# Patient Record
Sex: FEMALE | Race: WHITE | NOT HISPANIC OR LATINO | Employment: FULL TIME | URBAN - METROPOLITAN AREA
[De-identification: names, ages, dates, MRNs, and addresses within clinical notes are randomized per-mention and may not be internally consistent; named-entity substitution may affect disease eponyms.]

---

## 2017-09-12 DIAGNOSIS — Z00.00 ROUTINE GENERAL MEDICAL EXAMINATION AT A HEALTH CARE FACILITY: Primary | ICD-10-CM

## 2017-09-25 ENCOUNTER — CLINICAL SUPPORT (OUTPATIENT)
Dept: CARDIOLOGY | Facility: CLINIC | Age: 37
End: 2017-09-25
Payer: COMMERCIAL

## 2017-09-25 ENCOUNTER — OFFICE VISIT (OUTPATIENT)
Dept: INTERNAL MEDICINE | Facility: CLINIC | Age: 37
End: 2017-09-25
Payer: COMMERCIAL

## 2017-09-25 ENCOUNTER — CLINICAL SUPPORT (OUTPATIENT)
Dept: INTERNAL MEDICINE | Facility: CLINIC | Age: 37
End: 2017-09-25
Payer: COMMERCIAL

## 2017-09-25 VITALS
TEMPERATURE: 98 F | BODY MASS INDEX: 25.43 KG/M2 | HEIGHT: 63 IN | WEIGHT: 143.5 LBS | SYSTOLIC BLOOD PRESSURE: 108 MMHG | HEART RATE: 68 BPM | DIASTOLIC BLOOD PRESSURE: 68 MMHG

## 2017-09-25 VITALS
SYSTOLIC BLOOD PRESSURE: 108 MMHG | DIASTOLIC BLOOD PRESSURE: 68 MMHG | WEIGHT: 143.5 LBS | BODY MASS INDEX: 25.42 KG/M2 | HEART RATE: 68 BPM

## 2017-09-25 DIAGNOSIS — Z00.00 ROUTINE GENERAL MEDICAL EXAMINATION AT A HEALTH CARE FACILITY: ICD-10-CM

## 2017-09-25 DIAGNOSIS — Z00.00 ROUTINE GENERAL MEDICAL EXAMINATION AT A HEALTH CARE FACILITY: Primary | ICD-10-CM

## 2017-09-25 DIAGNOSIS — Z00.00 ANNUAL PHYSICAL EXAM: Primary | ICD-10-CM

## 2017-09-25 LAB
ALBUMIN SERPL BCP-MCNC: 4.3 G/DL
ALP SERPL-CCNC: 37 U/L
ALT SERPL W/O P-5'-P-CCNC: 8 U/L
ANION GAP SERPL CALC-SCNC: 9 MMOL/L
AST SERPL-CCNC: 14 U/L
BILIRUB SERPL-MCNC: 0.7 MG/DL
BUN SERPL-MCNC: 8 MG/DL
CALCIUM SERPL-MCNC: 9.6 MG/DL
CHLORIDE SERPL-SCNC: 108 MMOL/L
CHOLEST SERPL-MCNC: 184 MG/DL
CHOLEST/HDLC SERPL: 3.4 {RATIO}
CO2 SERPL-SCNC: 25 MMOL/L
CREAT SERPL-MCNC: 0.7 MG/DL
ERYTHROCYTE [DISTWIDTH] IN BLOOD BY AUTOMATED COUNT: 13 %
EST. GFR  (AFRICAN AMERICAN): >60 ML/MIN/1.73 M^2
EST. GFR  (NON AFRICAN AMERICAN): >60 ML/MIN/1.73 M^2
ESTIMATED AVG GLUCOSE: 94 MG/DL
GLUCOSE SERPL-MCNC: 93 MG/DL
HBA1C MFR BLD HPLC: 4.9 %
HCT VFR BLD AUTO: 39.3 %
HDLC SERPL-MCNC: 54 MG/DL
HDLC SERPL: 29.3 %
HGB BLD-MCNC: 13.6 G/DL
LDLC SERPL CALC-MCNC: 114.8 MG/DL
MCH RBC QN AUTO: 29.8 PG
MCHC RBC AUTO-ENTMCNC: 34.6 G/DL
MCV RBC AUTO: 86 FL
NONHDLC SERPL-MCNC: 130 MG/DL
PLATELET # BLD AUTO: 214 K/UL
PMV BLD AUTO: 10.2 FL
POTASSIUM SERPL-SCNC: 4.4 MMOL/L
PROT SERPL-MCNC: 7.8 G/DL
RBC # BLD AUTO: 4.57 M/UL
SODIUM SERPL-SCNC: 142 MMOL/L
TRIGL SERPL-MCNC: 76 MG/DL
WBC # BLD AUTO: 6.96 K/UL

## 2017-09-25 PROCEDURE — 93000 ELECTROCARDIOGRAM COMPLETE: CPT | Mod: S$GLB,,, | Performed by: NUCLEAR MEDICINE

## 2017-09-25 PROCEDURE — 83036 HEMOGLOBIN GLYCOSYLATED A1C: CPT

## 2017-09-25 PROCEDURE — 80053 COMPREHEN METABOLIC PANEL: CPT | Mod: PO

## 2017-09-25 PROCEDURE — 99999 PR PBB SHADOW E&M-EST. PATIENT-LVL III: CPT | Mod: PBBFAC,,, | Performed by: FAMILY MEDICINE

## 2017-09-25 PROCEDURE — 85027 COMPLETE CBC AUTOMATED: CPT | Mod: PO

## 2017-09-25 PROCEDURE — 99395 PREV VISIT EST AGE 18-39: CPT | Mod: S$GLB,,, | Performed by: FAMILY MEDICINE

## 2017-09-25 PROCEDURE — 80061 LIPID PANEL: CPT | Mod: PO

## 2017-09-25 NOTE — PROGRESS NOTES
Subjective:       Patient ID: Dominga Contreras is a 36 y.o. female.    Chief Complaint: Annual Exam    Annual Exam:      Pt is a healthy 36 year old who is in generally good health. Pt is up-to-date on her women wellness.       Review of Systems   Constitutional: Negative.  Negative for activity change, chills, fatigue and fever.   HENT: Negative.  Negative for congestion, ear pain, postnasal drip, rhinorrhea, sinus pressure, sore throat and trouble swallowing.    Eyes: Negative for visual disturbance.   Respiratory: Negative.  Negative for cough, chest tightness, shortness of breath and wheezing.    Gastrointestinal: Negative.    Endocrine: Negative.  Negative for cold intolerance and heat intolerance.   Genitourinary: Negative.  Negative for dysuria, hematuria, urgency, vaginal bleeding and vaginal discharge.   Musculoskeletal: Negative for arthralgias, back pain, joint swelling and neck stiffness.   Skin: Negative for color change and rash.   Neurological: Negative.  Negative for dizziness, tremors, seizures, syncope, weakness, light-headedness and headaches.   Hematological: Negative.    Psychiatric/Behavioral: Negative.  Negative for agitation, confusion, sleep disturbance and suicidal ideas. The patient is not nervous/anxious.        Objective:      Physical Exam   Constitutional: She is oriented to person, place, and time. She appears well-developed and well-nourished.   HENT:   Head: Normocephalic.   Eyes: EOM are normal. Pupils are equal, round, and reactive to light.   Neck: Normal range of motion. No thyromegaly present.   Cardiovascular: Normal rate and regular rhythm.    No murmur heard.  Pulmonary/Chest: Effort normal and breath sounds normal.   Abdominal: Soft. Bowel sounds are normal.   Musculoskeletal: Normal range of motion.   Neurological: She is alert and oriented to person, place, and time. She has normal reflexes.   Skin: Skin is warm.   Psychiatric: She has a normal mood and affect. Her behavior  is normal.   Vitals reviewed.      Assessment:       No diagnosis found.    Plan:       There are no diagnoses linked to this encounter.

## 2018-08-23 ENCOUNTER — PATIENT OUTREACH (OUTPATIENT)
Dept: ADMINISTRATIVE | Facility: HOSPITAL | Age: 38
End: 2018-08-23

## 2018-09-05 ENCOUNTER — OFFICE VISIT (OUTPATIENT)
Dept: INTERNAL MEDICINE | Facility: CLINIC | Age: 38
End: 2018-09-05
Payer: COMMERCIAL

## 2018-09-05 ENCOUNTER — CLINICAL SUPPORT (OUTPATIENT)
Dept: INTERNAL MEDICINE | Facility: CLINIC | Age: 38
End: 2018-09-05

## 2018-09-05 VITALS
DIASTOLIC BLOOD PRESSURE: 78 MMHG | BODY MASS INDEX: 25.01 KG/M2 | BODY MASS INDEX: 24.99 KG/M2 | SYSTOLIC BLOOD PRESSURE: 110 MMHG | SYSTOLIC BLOOD PRESSURE: 110 MMHG | HEART RATE: 62 BPM | HEIGHT: 63 IN | HEART RATE: 62 BPM | WEIGHT: 141.13 LBS | DIASTOLIC BLOOD PRESSURE: 78 MMHG | TEMPERATURE: 98 F | RESPIRATION RATE: 14 BRPM | WEIGHT: 141.13 LBS

## 2018-09-05 DIAGNOSIS — Z00.00 ANNUAL PHYSICAL EXAM: Primary | ICD-10-CM

## 2018-09-05 DIAGNOSIS — Z00.00 ROUTINE GENERAL MEDICAL EXAMINATION AT A HEALTH CARE FACILITY: Primary | ICD-10-CM

## 2018-09-05 LAB
ALBUMIN SERPL BCP-MCNC: 4.4 G/DL
ALP SERPL-CCNC: 32 U/L
ALT SERPL W/O P-5'-P-CCNC: 13 U/L
ANION GAP SERPL CALC-SCNC: 8 MMOL/L
AST SERPL-CCNC: 16 U/L
BILIRUB SERPL-MCNC: 0.5 MG/DL
BUN SERPL-MCNC: 11 MG/DL
CALCIUM SERPL-MCNC: 9.4 MG/DL
CHLORIDE SERPL-SCNC: 105 MMOL/L
CHOLEST SERPL-MCNC: 193 MG/DL
CHOLEST/HDLC SERPL: 3.1 {RATIO}
CO2 SERPL-SCNC: 27 MMOL/L
CREAT SERPL-MCNC: 0.8 MG/DL
ERYTHROCYTE [DISTWIDTH] IN BLOOD BY AUTOMATED COUNT: 13.1 %
EST. GFR  (AFRICAN AMERICAN): >60 ML/MIN/1.73 M^2
EST. GFR  (NON AFRICAN AMERICAN): >60 ML/MIN/1.73 M^2
ESTIMATED AVG GLUCOSE: 94 MG/DL
GLUCOSE SERPL-MCNC: 91 MG/DL
HBA1C MFR BLD HPLC: 4.9 %
HCT VFR BLD AUTO: 39.9 %
HDLC SERPL-MCNC: 62 MG/DL
HDLC SERPL: 32.1 %
HGB BLD-MCNC: 13.1 G/DL
LDLC SERPL CALC-MCNC: 108.6 MG/DL
MCH RBC QN AUTO: 28.4 PG
MCHC RBC AUTO-ENTMCNC: 32.8 G/DL
MCV RBC AUTO: 87 FL
NONHDLC SERPL-MCNC: 131 MG/DL
PLATELET # BLD AUTO: 243 K/UL
PMV BLD AUTO: 9.9 FL
POTASSIUM SERPL-SCNC: 4.3 MMOL/L
PROT SERPL-MCNC: 7.7 G/DL
RBC # BLD AUTO: 4.61 M/UL
SODIUM SERPL-SCNC: 140 MMOL/L
TRIGL SERPL-MCNC: 112 MG/DL
WBC # BLD AUTO: 4.82 K/UL

## 2018-09-05 PROCEDURE — 80061 LIPID PANEL: CPT | Mod: PO

## 2018-09-05 PROCEDURE — 99385 PREV VISIT NEW AGE 18-39: CPT | Mod: S$GLB,,, | Performed by: FAMILY MEDICINE

## 2018-09-05 PROCEDURE — 80053 COMPREHEN METABOLIC PANEL: CPT | Mod: PO

## 2018-09-05 PROCEDURE — 83036 HEMOGLOBIN GLYCOSYLATED A1C: CPT

## 2018-09-05 PROCEDURE — 99999 PR PBB SHADOW E&M-EST. PATIENT-LVL III: CPT | Mod: PBBFAC,,, | Performed by: FAMILY MEDICINE

## 2018-09-05 PROCEDURE — 85027 COMPLETE CBC AUTOMATED: CPT | Mod: PO

## 2018-09-05 NOTE — PROGRESS NOTES
Subjective:       Patient ID: Dominga Contreras is a 37 y.o. female.    Chief Complaint: Executive Health    Annual exam:      Pt is a 37 year old healthy female. No acute or chronic medical issues. She is up-to-date on her wellness.       Review of Systems   Constitutional: Negative.  Negative for activity change, chills, fatigue and fever.   HENT: Negative.  Negative for congestion, ear pain, postnasal drip, rhinorrhea, sinus pressure, sore throat and trouble swallowing.    Eyes: Negative for visual disturbance.   Respiratory: Negative.  Negative for cough, chest tightness, shortness of breath and wheezing.    Gastrointestinal: Negative.    Endocrine: Negative.  Negative for cold intolerance and heat intolerance.   Genitourinary: Negative.  Negative for dysuria, hematuria, urgency, vaginal bleeding and vaginal discharge.   Musculoskeletal: Negative for arthralgias, back pain, joint swelling and neck stiffness.   Skin: Negative for color change and rash.   Neurological: Negative.  Negative for dizziness, tremors, seizures, syncope, weakness, light-headedness and headaches.   Hematological: Negative.    Psychiatric/Behavioral: Negative.  Negative for agitation, confusion, sleep disturbance and suicidal ideas. The patient is not nervous/anxious.        Objective:      Physical Exam   Constitutional: She is oriented to person, place, and time. She appears well-developed and well-nourished.   HENT:   Head: Normocephalic.   Eyes: EOM are normal. Pupils are equal, round, and reactive to light.   Neck: Normal range of motion. No thyromegaly present.   Cardiovascular: Normal rate and regular rhythm.   No murmur heard.  Pulmonary/Chest: Effort normal and breath sounds normal.   Abdominal: Soft. Bowel sounds are normal.   Musculoskeletal: Normal range of motion.   Neurological: She is alert and oriented to person, place, and time. She has normal reflexes.   Skin: Skin is warm.   Psychiatric: She has a normal mood and affect. Her  behavior is normal.   Vitals reviewed.      Assessment:       1. Annual physical exam        Plan:       Annual physical exam  Comments:  Pt is over all healthy without any chronic issues

## 2018-12-10 PROBLEM — Z00.00 ANNUAL PHYSICAL EXAM: Status: RESOLVED | Noted: 2018-09-05 | Resolved: 2018-12-10

## 2019-06-20 DIAGNOSIS — Z00.00 ROUTINE GENERAL MEDICAL EXAMINATION AT A HEALTH CARE FACILITY: Primary | ICD-10-CM

## 2019-06-24 DIAGNOSIS — Z00.00 ROUTINE GENERAL MEDICAL EXAMINATION AT A HEALTH CARE FACILITY: Primary | ICD-10-CM

## 2019-08-09 ENCOUNTER — CLINICAL SUPPORT (OUTPATIENT)
Dept: INTERNAL MEDICINE | Facility: CLINIC | Age: 39
End: 2019-08-09
Payer: COMMERCIAL

## 2019-08-09 ENCOUNTER — CLINICAL SUPPORT (OUTPATIENT)
Dept: CARDIOLOGY | Facility: CLINIC | Age: 39
End: 2019-08-09
Payer: COMMERCIAL

## 2019-08-09 ENCOUNTER — OFFICE VISIT (OUTPATIENT)
Dept: INTERNAL MEDICINE | Facility: CLINIC | Age: 39
End: 2019-08-09
Payer: COMMERCIAL

## 2019-08-09 ENCOUNTER — CLINICAL SUPPORT (OUTPATIENT)
Dept: REHABILITATION | Facility: HOSPITAL | Age: 39
End: 2019-08-09
Payer: COMMERCIAL

## 2019-08-09 VITALS
DIASTOLIC BLOOD PRESSURE: 70 MMHG | BODY MASS INDEX: 25.38 KG/M2 | RESPIRATION RATE: 16 BRPM | TEMPERATURE: 98 F | WEIGHT: 143.31 LBS | HEART RATE: 72 BPM | SYSTOLIC BLOOD PRESSURE: 114 MMHG

## 2019-08-09 VITALS
WEIGHT: 143.31 LBS | DIASTOLIC BLOOD PRESSURE: 70 MMHG | RESPIRATION RATE: 16 BRPM | HEART RATE: 72 BPM | SYSTOLIC BLOOD PRESSURE: 114 MMHG | HEIGHT: 63 IN | BODY MASS INDEX: 25.39 KG/M2

## 2019-08-09 DIAGNOSIS — Z00.00 ROUTINE GENERAL MEDICAL EXAMINATION AT A HEALTH CARE FACILITY: Primary | ICD-10-CM

## 2019-08-09 DIAGNOSIS — Z00.00 ANNUAL PHYSICAL EXAM: Primary | ICD-10-CM

## 2019-08-09 DIAGNOSIS — Z00.00 ROUTINE GENERAL MEDICAL EXAMINATION AT A HEALTH CARE FACILITY: ICD-10-CM

## 2019-08-09 LAB
ALBUMIN SERPL BCP-MCNC: 4.4 G/DL (ref 3.5–5.2)
ALP SERPL-CCNC: 36 U/L (ref 55–135)
ALT SERPL W/O P-5'-P-CCNC: 14 U/L (ref 10–44)
ANION GAP SERPL CALC-SCNC: 10 MMOL/L (ref 8–16)
AST SERPL-CCNC: 15 U/L (ref 10–40)
BILIRUB SERPL-MCNC: 0.6 MG/DL (ref 0.1–1)
BUN SERPL-MCNC: 9 MG/DL (ref 6–20)
CALCIUM SERPL-MCNC: 10 MG/DL (ref 8.7–10.5)
CHLORIDE SERPL-SCNC: 104 MMOL/L (ref 95–110)
CHOLEST SERPL-MCNC: 207 MG/DL (ref 120–199)
CHOLEST/HDLC SERPL: 2.8 {RATIO} (ref 2–5)
CO2 SERPL-SCNC: 26 MMOL/L (ref 23–29)
CREAT SERPL-MCNC: 0.8 MG/DL (ref 0.5–1.4)
ERYTHROCYTE [DISTWIDTH] IN BLOOD BY AUTOMATED COUNT: 12.6 % (ref 11.5–14.5)
EST. GFR  (AFRICAN AMERICAN): >60 ML/MIN/1.73 M^2
EST. GFR  (NON AFRICAN AMERICAN): >60 ML/MIN/1.73 M^2
ESTIMATED AVG GLUCOSE: 97 MG/DL (ref 68–131)
GLUCOSE SERPL-MCNC: 90 MG/DL (ref 70–110)
HBA1C MFR BLD HPLC: 5 % (ref 4–5.6)
HCT VFR BLD AUTO: 39.9 % (ref 37–48.5)
HDLC SERPL-MCNC: 75 MG/DL (ref 40–75)
HDLC SERPL: 36.2 % (ref 20–50)
HGB BLD-MCNC: 13.2 G/DL (ref 12–16)
LDLC SERPL CALC-MCNC: 116.6 MG/DL (ref 63–159)
MCH RBC QN AUTO: 28.7 PG (ref 27–31)
MCHC RBC AUTO-ENTMCNC: 33.1 G/DL (ref 32–36)
MCV RBC AUTO: 87 FL (ref 82–98)
NONHDLC SERPL-MCNC: 132 MG/DL
PLATELET # BLD AUTO: 249 K/UL (ref 150–350)
PMV BLD AUTO: 9.8 FL (ref 9.2–12.9)
POTASSIUM SERPL-SCNC: 4.5 MMOL/L (ref 3.5–5.1)
PROT SERPL-MCNC: 7.6 G/DL (ref 6–8.4)
RBC # BLD AUTO: 4.6 M/UL (ref 4–5.4)
SODIUM SERPL-SCNC: 140 MMOL/L (ref 136–145)
TRIGL SERPL-MCNC: 77 MG/DL (ref 30–150)
TSH SERPL DL<=0.005 MIU/L-ACNC: 1.11 UIU/ML (ref 0.4–4)
WBC # BLD AUTO: 6.05 K/UL (ref 3.9–12.7)

## 2019-08-09 PROCEDURE — 83036 HEMOGLOBIN GLYCOSYLATED A1C: CPT

## 2019-08-09 PROCEDURE — 93015 CARDIAC TREADMILL STRESS TEST: ICD-10-PCS | Mod: S$GLB,,, | Performed by: INTERNAL MEDICINE

## 2019-08-09 PROCEDURE — 93010 ELECTROCARDIOGRAM REPORT: CPT | Mod: S$PBB,,, | Performed by: INTERNAL MEDICINE

## 2019-08-09 PROCEDURE — 93015 CV STRESS TEST SUPVJ I&R: CPT | Mod: S$GLB,,, | Performed by: INTERNAL MEDICINE

## 2019-08-09 PROCEDURE — 99999 PR PBB SHADOW E&M-EST. PATIENT-LVL III: CPT | Mod: PBBFAC,,, | Performed by: FAMILY MEDICINE

## 2019-08-09 PROCEDURE — 99386 PREV VISIT NEW AGE 40-64: CPT | Mod: S$GLB,,, | Performed by: FAMILY MEDICINE

## 2019-08-09 PROCEDURE — 84443 ASSAY THYROID STIM HORMONE: CPT

## 2019-08-09 PROCEDURE — 97750 PHYSICAL PERFORMANCE TEST: CPT | Performed by: PHYSICAL THERAPIST

## 2019-08-09 PROCEDURE — 93005 ELECTROCARDIOGRAM TRACING: CPT | Mod: PBBFAC | Performed by: INTERNAL MEDICINE

## 2019-08-09 PROCEDURE — 99386 PR PREVENTIVE VISIT,NEW,40-64: ICD-10-PCS | Mod: S$GLB,,, | Performed by: FAMILY MEDICINE

## 2019-08-09 PROCEDURE — 80053 COMPREHEN METABOLIC PANEL: CPT

## 2019-08-09 PROCEDURE — 99999 PR PBB SHADOW E&M-EST. PATIENT-LVL III: ICD-10-PCS | Mod: PBBFAC,,, | Performed by: FAMILY MEDICINE

## 2019-08-09 PROCEDURE — 93010 EKG 12-LEAD: ICD-10-PCS | Mod: S$PBB,,, | Performed by: INTERNAL MEDICINE

## 2019-08-09 PROCEDURE — 85027 COMPLETE CBC AUTOMATED: CPT

## 2019-08-09 PROCEDURE — 80061 LIPID PANEL: CPT

## 2019-08-09 NOTE — PROGRESS NOTES
Subjective:       Patient ID: Dominga Contreras is a 38 y.o. female.    Chief Complaint: Executive Health    Annual Exam:      Pt is a 38 year old here for executive physical. Pt is overall healthy with no major medical. She does see GYN with [facility].    Review of Systems   Constitutional: Negative.    Respiratory: Negative.    Cardiovascular: Negative.    Genitourinary: Negative.    Musculoskeletal: Negative.    Neurological: Negative.    Hematological: Negative.    Psychiatric/Behavioral: Negative.        Objective:      Physical Exam   Constitutional: She is oriented to person, place, and time. She appears well-developed and well-nourished.   Cardiovascular: Normal rate and regular rhythm. Exam reveals no friction rub.   No murmur heard.  Pulmonary/Chest: Effort normal and breath sounds normal. No stridor.   Abdominal: Soft. Bowel sounds are normal. She exhibits no distension.   Neurological: She is alert and oriented to person, place, and time.   Skin: Skin is warm and dry.   Psychiatric: She has a normal mood and affect. Her behavior is normal.       Assessment:       1. Annual physical exam        Plan:       Annual physical exam  Comments:  Will do CBC, CMP, Lipid

## 2019-08-09 NOTE — PROGRESS NOTES
:  76    DX: Z00.0  Orders:  Fitness Evaluation    Patient's 2nd [facility] Fitness Component evaluation was completed.  Results are as follows:    Height (in):    63                            Weight (lbs):    143                                    BMI:   25.4    Resting Energy Expenditure:         1290       kcal/24 hrs.              Estimated:    1292     REE measured post treadmill:     Yes     ~ 45 minutes   REE measured fasting:       Yes           Body Composition:          27.76  % body fat             Rating: Good    Waist to Hip Ratio:     0.72                    Risk:  low   Hip taken over clothing:      Yes          Muscular Strength and Endurance Assessment:               Strength (lbs):     Right: 57.7             Rating:  Below average      Left:  54.7           Rating: average   Push-ups:   28                 Rating:  excellent   Curl-ups :   50                Rating:  Well above average    Flexibility Testing:   Sit and Reach (cm):    46                       Rating:  excellent    The patient completed the testing procedures without complications.

## 2019-08-21 LAB — DIASTOLIC DYSFUNCTION: NO

## 2019-11-11 PROBLEM — Z00.00 ANNUAL PHYSICAL EXAM: Status: RESOLVED | Noted: 2018-09-05 | Resolved: 2019-11-11

## 2020-08-11 DIAGNOSIS — Z91.89 AT RISK FOR CORONARY ARTERY DISEASE: Primary | ICD-10-CM

## 2020-08-12 ENCOUNTER — CLINICAL SUPPORT (OUTPATIENT)
Dept: INTERNAL MEDICINE | Facility: CLINIC | Age: 40
End: 2020-08-12
Payer: COMMERCIAL

## 2020-08-12 ENCOUNTER — HOSPITAL ENCOUNTER (OUTPATIENT)
Dept: RADIOLOGY | Facility: HOSPITAL | Age: 40
Discharge: HOME | End: 2020-08-12
Attending: FAMILY MEDICINE
Payer: COMMERCIAL

## 2020-08-12 ENCOUNTER — OFFICE VISIT (OUTPATIENT)
Dept: INTERNAL MEDICINE | Facility: CLINIC | Age: 40
End: 2020-08-12
Payer: COMMERCIAL

## 2020-08-12 VITALS
HEART RATE: 82 BPM | BODY MASS INDEX: 27.34 KG/M2 | TEMPERATURE: 100 F | WEIGHT: 154.31 LBS | SYSTOLIC BLOOD PRESSURE: 124 MMHG | OXYGEN SATURATION: 98 % | HEIGHT: 63 IN | DIASTOLIC BLOOD PRESSURE: 77 MMHG

## 2020-08-12 DIAGNOSIS — Z00.00 ROUTINE GENERAL MEDICAL EXAMINATION AT A HEALTH CARE FACILITY: Primary | ICD-10-CM

## 2020-08-12 DIAGNOSIS — Z91.89 AT RISK FOR CORONARY ARTERY DISEASE: ICD-10-CM

## 2020-08-12 DIAGNOSIS — R91.1 SOLITARY PULMONARY NODULE: ICD-10-CM

## 2020-08-12 DIAGNOSIS — Z00.00 ANNUAL PHYSICAL EXAM: Primary | ICD-10-CM

## 2020-08-12 LAB
ALBUMIN SERPL BCP-MCNC: 4.4 G/DL (ref 3.5–5.2)
ALP SERPL-CCNC: 30 U/L (ref 55–135)
ALT SERPL W/O P-5'-P-CCNC: 14 U/L (ref 10–44)
ANION GAP SERPL CALC-SCNC: 10 MMOL/L (ref 8–16)
AST SERPL-CCNC: 15 U/L (ref 10–40)
BILIRUB SERPL-MCNC: 0.5 MG/DL (ref 0.1–1)
BUN SERPL-MCNC: 9 MG/DL (ref 6–20)
CALCIUM SERPL-MCNC: 9.2 MG/DL (ref 8.7–10.5)
CHLORIDE SERPL-SCNC: 104 MMOL/L (ref 95–110)
CHOLEST SERPL-MCNC: 209 MG/DL (ref 120–199)
CHOLEST/HDLC SERPL: 3.3 {RATIO} (ref 2–5)
CO2 SERPL-SCNC: 24 MMOL/L (ref 23–29)
CREAT SERPL-MCNC: 0.8 MG/DL (ref 0.5–1.4)
ERYTHROCYTE [DISTWIDTH] IN BLOOD BY AUTOMATED COUNT: 12.7 % (ref 11.5–14.5)
EST. GFR  (AFRICAN AMERICAN): >60 ML/MIN/1.73 M^2
EST. GFR  (NON AFRICAN AMERICAN): >60 ML/MIN/1.73 M^2
ESTIMATED AVG GLUCOSE: 100 MG/DL (ref 68–131)
GLUCOSE SERPL-MCNC: 92 MG/DL (ref 70–110)
HBA1C MFR BLD HPLC: 5.1 % (ref 4–5.6)
HCT VFR BLD AUTO: 39.4 % (ref 37–48.5)
HDLC SERPL-MCNC: 63 MG/DL (ref 40–75)
HDLC SERPL: 30.1 % (ref 20–50)
HGB BLD-MCNC: 13 G/DL (ref 12–16)
LDLC SERPL CALC-MCNC: 126.4 MG/DL (ref 63–159)
MCH RBC QN AUTO: 29 PG (ref 27–31)
MCHC RBC AUTO-ENTMCNC: 33 G/DL (ref 32–36)
MCV RBC AUTO: 88 FL (ref 82–98)
NONHDLC SERPL-MCNC: 146 MG/DL
PLATELET # BLD AUTO: 230 K/UL (ref 150–350)
PMV BLD AUTO: 9.7 FL (ref 9.2–12.9)
POTASSIUM SERPL-SCNC: 4.3 MMOL/L (ref 3.5–5.1)
PROT SERPL-MCNC: 7.7 G/DL (ref 6–8.4)
RBC # BLD AUTO: 4.49 M/UL (ref 4–5.4)
SODIUM SERPL-SCNC: 138 MMOL/L (ref 136–145)
TRIGL SERPL-MCNC: 98 MG/DL (ref 30–150)
TSH SERPL DL<=0.005 MIU/L-ACNC: 1.23 UIU/ML (ref 0.4–4)
WBC # BLD AUTO: 4.82 K/UL (ref 3.9–12.7)

## 2020-08-12 PROCEDURE — 3008F BODY MASS INDEX DOCD: CPT | Mod: CPTII,S$GLB,, | Performed by: FAMILY MEDICINE

## 2020-08-12 PROCEDURE — 99999 PR PBB SHADOW E&M-EST. PATIENT-LVL III: CPT | Mod: PBBFAC,,, | Performed by: FAMILY MEDICINE

## 2020-08-12 PROCEDURE — 75571 CT CALCIUM SCORING CARDIAC: ICD-10-PCS | Mod: 26,,, | Performed by: RADIOLOGY

## 2020-08-12 PROCEDURE — 84443 ASSAY THYROID STIM HORMONE: CPT

## 2020-08-12 PROCEDURE — 80053 COMPREHEN METABOLIC PANEL: CPT

## 2020-08-12 PROCEDURE — 75571 CT HRT W/O DYE W/CA TEST: CPT | Mod: 26,,, | Performed by: RADIOLOGY

## 2020-08-12 PROCEDURE — 99386 PREV VISIT NEW AGE 40-64: CPT | Mod: S$GLB,,, | Performed by: FAMILY MEDICINE

## 2020-08-12 PROCEDURE — 99386 PR PREVENTIVE VISIT,NEW,40-64: ICD-10-PCS | Mod: S$GLB,,, | Performed by: FAMILY MEDICINE

## 2020-08-12 PROCEDURE — 3008F PR BODY MASS INDEX (BMI) DOCUMENTED: ICD-10-PCS | Mod: CPTII,S$GLB,, | Performed by: FAMILY MEDICINE

## 2020-08-12 PROCEDURE — 80061 LIPID PANEL: CPT

## 2020-08-12 PROCEDURE — 75571 CT HRT W/O DYE W/CA TEST: CPT | Mod: TC

## 2020-08-12 PROCEDURE — 99999 PR PBB SHADOW E&M-EST. PATIENT-LVL III: ICD-10-PCS | Mod: PBBFAC,,, | Performed by: FAMILY MEDICINE

## 2020-08-12 PROCEDURE — 83036 HEMOGLOBIN GLYCOSYLATED A1C: CPT

## 2020-08-12 PROCEDURE — 85027 COMPLETE CBC AUTOMATED: CPT

## 2020-08-12 NOTE — PROGRESS NOTES
Subjective:       Patient ID: Dominga Contreras is a 39 y.o. female.    Chief Complaint: Executive Health    Pt is a healthy 39 year old. Reviewed her labs and CT cardiac which was zero. However did  a 4 mm pulmonary nodule.    Review of Systems   Respiratory: Negative.    Cardiovascular: Negative.    Genitourinary: Negative.    Neurological: Negative.    Hematological: Negative.    Psychiatric/Behavioral: Negative.          Objective:      Physical Exam  Vitals signs reviewed.   Constitutional:       Appearance: She is well-developed.   HENT:      Head: Normocephalic.   Eyes:      Pupils: Pupils are equal, round, and reactive to light.   Neck:      Musculoskeletal: Normal range of motion.      Thyroid: No thyromegaly.   Cardiovascular:      Rate and Rhythm: Normal rate and regular rhythm.      Heart sounds: No murmur.   Pulmonary:      Effort: Pulmonary effort is normal.      Breath sounds: Normal breath sounds.   Abdominal:      General: Bowel sounds are normal.      Palpations: Abdomen is soft.   Musculoskeletal: Normal range of motion.   Skin:     General: Skin is warm.   Neurological:      Mental Status: She is alert and oriented to person, place, and time.      Deep Tendon Reflexes: Reflexes are normal and symmetric.   Psychiatric:         Behavior: Behavior normal.         Assessment:       1. Annual physical exam    2. Solitary pulmonary nodule        Plan:       Annual physical exam  Comments:  Pt is over all healthy    Solitary pulmonary nodule  Comments:  Will do CT scan without contrast  Orders:  -     CT Chest Without Contrast; Future; Expected date: 08/12/2020

## 2020-08-17 ENCOUNTER — HOSPITAL ENCOUNTER (OUTPATIENT)
Dept: RADIOLOGY | Facility: HOSPITAL | Age: 40
Discharge: HOME | End: 2020-08-17
Attending: FAMILY MEDICINE
Payer: COMMERCIAL

## 2020-08-17 DIAGNOSIS — R91.1 SOLITARY PULMONARY NODULE: ICD-10-CM

## 2020-08-17 PROCEDURE — 71250 CT CHEST WITHOUT CONTRAST: ICD-10-PCS | Mod: 26,,, | Performed by: RADIOLOGY

## 2020-08-17 PROCEDURE — 71250 CT THORAX DX C-: CPT | Mod: 26,,, | Performed by: RADIOLOGY

## 2020-08-17 PROCEDURE — 71250 CT THORAX DX C-: CPT | Mod: TC

## 2020-08-18 ENCOUNTER — TELEPHONE (OUTPATIENT)
Dept: INTERNAL MEDICINE | Facility: CLINIC | Age: 40
End: 2020-08-18

## 2020-08-18 NOTE — TELEPHONE ENCOUNTER
----- Message from Priscilla Romano sent at 8/18/2020  3:26 PM CDT -----  Type:  Test Results    Who Called: Dominga  Name of Test (Lab/Mammo/Etc): CT  Date of Test: 08/17/2020  Ordering Provider: Grabiel  Where the test was performed: [facility]  Would the patient rather a call back or a response via [facility]? call  Best Call Back Number: [phone]    Additional Information:

## 2020-08-18 NOTE — TELEPHONE ENCOUNTER
Informed pt that her message regarding results has been sent to Dr. [place] and that as soon as we hear back from him, we will contact her. She verbalized understanding.

## 2020-11-10 ENCOUNTER — OFFICE VISIT (OUTPATIENT)
Dept: URGENT CARE | Facility: CLINIC | Age: 40
End: 2020-11-10
Payer: COMMERCIAL

## 2020-11-10 VITALS
WEIGHT: 150 LBS | HEART RATE: 87 BPM | OXYGEN SATURATION: 100 % | DIASTOLIC BLOOD PRESSURE: 76 MMHG | RESPIRATION RATE: 18 BRPM | HEIGHT: 63 IN | SYSTOLIC BLOOD PRESSURE: 125 MMHG | TEMPERATURE: 99 F | BODY MASS INDEX: 26.58 KG/M2

## 2020-11-10 DIAGNOSIS — R50.9 FEVER, UNSPECIFIED FEVER CAUSE: ICD-10-CM

## 2020-11-10 DIAGNOSIS — J32.9 SINUSITIS, UNSPECIFIED CHRONICITY, UNSPECIFIED LOCATION: ICD-10-CM

## 2020-11-10 DIAGNOSIS — R53.83 FATIGUE, UNSPECIFIED TYPE: Primary | ICD-10-CM

## 2020-11-10 LAB
CTP QC/QA: YES
SARS-COV-2 RDRP RESP QL NAA+PROBE: NEGATIVE

## 2020-11-10 PROCEDURE — U0002 COVID-19 LAB TEST NON-CDC: HCPCS | Mod: QW,S$GLB,, | Performed by: PHYSICIAN ASSISTANT

## 2020-11-10 PROCEDURE — 99213 PR OFFICE/OUTPT VISIT, EST, LEVL III, 20-29 MIN: ICD-10-PCS | Mod: S$GLB,CS,, | Performed by: PHYSICIAN ASSISTANT

## 2020-11-10 PROCEDURE — U0002: ICD-10-PCS | Mod: QW,S$GLB,, | Performed by: PHYSICIAN ASSISTANT

## 2020-11-10 PROCEDURE — 99213 OFFICE O/P EST LOW 20 MIN: CPT | Mod: S$GLB,CS,, | Performed by: PHYSICIAN ASSISTANT

## 2020-11-10 RX ORDER — AMOXICILLIN AND CLAVULANATE POTASSIUM 875; 125 MG/1; MG/1
1 TABLET, FILM COATED ORAL 2 TIMES DAILY
Qty: 20 TABLET | Refills: 0 | Status: SHIPPED | OUTPATIENT
Start: 2020-11-10 | End: 2021-08-25

## 2020-11-10 NOTE — LETTER
[address] [facility] [address] ? [place], [id number] ? Phone [phone] ? Fax             Return to Work/School    Patient: Dominga Contreras  YOB: 1980   Date: 11/10/2020      To Whom It May Concern:     Dominga Contreras was in contact with/seen in my office on 11/10/2020. COVID-19 is present in our communities across the state. Not all patients are eligible or appropriate to be tested. In this situation, your employee meets the following criteria:     Dominga Contreras has met the criteria for COVID-19 testing and has a NEGATIVE result. The employee can return to work once they are asymptomatic for 24 hours without the use of fever reducing medications (Tylenol, Motrin, etc).     If you have any questions or concerns, or if I can be of further assistance, please do not hesitate to contact me.     Sincerely,    Sobia Conley PA-C

## 2020-11-10 NOTE — PROGRESS NOTES
"Subjective:       Patient ID: Dominga Contreras is a 39 y.o. female.    Vitals:  height is 5' 3" (1.6 m) and weight is 68 kg (150 lb). Her temporal temperature is 98.8 °F (37.1 °C). Her blood pressure is 125/76 and her pulse is 87. Her respiration is 18 and oxygen saturation is 100%.     Chief Complaint: No chief complaint on file.    Symptoms began yesterday. Headache began Sunday. Pt took motrin for symptoms. Pt states headache has her ears hurting and she is having a lot of pressure in her head. More pressure in ears due to headache. She also reports fever >100. Pt's daughter was recently exposed to covid and is required to quarantine from school.     Sinus Problem  This is a new problem. The current episode started yesterday. The problem has been gradually worsening since onset. The maximum temperature recorded prior to her arrival was 100.4 - 100.9 F. Her pain is at a severity of 6/10. The pain is mild. Associated symptoms include chills, diaphoresis, ear pain, headaches and sinus pressure. Pertinent negatives include no congestion, coughing, shortness of breath or sore throat. Treatments tried: motrin. The treatment provided mild relief.       Constitution: Positive for chills, sweating, fatigue, fever (over 100 ) and generalized weakness.   HENT: Positive for ear pain, sinus pain and sinus pressure. Negative for congestion, sore throat and voice change.    Neck: Negative for painful lymph nodes.   Eyes: Negative for eye redness.   Respiratory: Negative for chest tightness, cough, sputum production, bloody sputum, COPD, shortness of breath, stridor, wheezing and asthma.    Gastrointestinal: Negative for nausea and vomiting.   Musculoskeletal: Negative for muscle ache.   Skin: Negative for rash.   Allergic/Immunologic: Negative for seasonal allergies and asthma.   Neurological: Positive for headaches.   Hematologic/Lymphatic: Negative for swollen lymph nodes.       Objective:      Physical Exam "   Constitutional: She is oriented to person, place, and time. She appears well-developed. She is cooperative.  Non-toxic appearance. She does not appear ill. No distress.   HENT:   Head: Normocephalic and atraumatic.   Ears:   Right Ear: Hearing, external ear and ear canal normal. A middle ear effusion is present.   Left Ear: Hearing, tympanic membrane, external ear and ear canal normal.   Nose: No mucosal edema, rhinorrhea or nasal deformity. No epistaxis. Right sinus exhibits maxillary sinus tenderness and frontal sinus tenderness. Left sinus exhibits maxillary sinus tenderness and frontal sinus tenderness.   Mouth/Throat: Uvula is midline, oropharynx is clear and moist and mucous membranes are normal. No trismus in the jaw. Normal dentition. No uvula swelling. No oropharyngeal exudate, posterior oropharyngeal edema or posterior oropharyngeal erythema.   Eyes: Conjunctivae and lids are normal. No scleral icterus.   Neck: Trachea normal, full passive range of motion without pain and phonation normal. Neck supple. No neck rigidity. No edema and no erythema present.   Cardiovascular: Normal rate, regular rhythm, normal heart sounds and normal pulses.   Pulmonary/Chest: Effort normal and breath sounds normal. No respiratory distress. She has no decreased breath sounds. She has no rhonchi.   Abdominal: Normal appearance.   Musculoskeletal: Normal range of motion.         General: No deformity.   Neurological: She is alert and oriented to person, place, and time. She exhibits normal muscle tone. Coordination normal.   Skin: Skin is warm, dry, intact, not diaphoretic and not pale. Psychiatric: Her speech is normal and behavior is normal. Judgment and thought content normal.   Nursing note and vitals reviewed.    Results for orders placed or performed in visit on 11/10/20   POCT COVID-19 Rapid Screening   Result Value Ref Range    POC Rapid COVID Negative Negative     Acceptable Yes            Assessment:        1. Fatigue, unspecified type    2. Fever, unspecified fever cause    3. Sinusitis, unspecified chronicity, unspecified location        Plan:       COVID testing negative.  Recommend OTC medications as needed for cold symptoms (Sudafed, Mucinex, Flonase).  Tylenol as needed for fever.  Rest and drink plenty of fluids.  Wait and see antibiotics discussed. Patient can return to clinic or follow up with PCP if symptoms worsen or fail to improve. Pt voiced understanding and all questions were answered prior to discharge.     Fatigue, unspecified type  -     POCT COVID-19 Rapid Screening    Fever, unspecified fever cause  -     POCT COVID-19 Rapid Screening    Sinusitis, unspecified chronicity, unspecified location  -     amoxicillin-clavulanate 875-125mg (AUGMENTIN) 875-125 mg per tablet; Take 1 tablet by mouth 2 (two) times daily.  Dispense: 20 tablet; Refill: 0      Patient Instructions     Sinusitis (Antibiotic Treatment)    The sinuses are air-filled spaces within the bones of the face. They connect to the inside of the nose. Sinusitis is an inflammation of the tissue lining the sinus cavity. Sinus inflammation can occur during a cold. It can also be due to allergies to pollens and other particles in the air. Sinusitis can cause symptoms of sinus congestion and fullness. A sinus infection causes fever, headache and facial pain. There is often green or yellow drainage from the nose or into the back of the throat (post-nasal drip). You have been given antibiotics to treat this condition.  Home care:  · Take the full course of antibiotics as instructed. Do not stop taking them, even if you feel better.  · Drink plenty of water, hot tea, and other liquids. This may help thin mucus. It also may promote sinus drainage.  · Heat may help soothe painful areas of the face. Use a towel soaked in hot water. Or,  the shower and direct the hot spray onto your face. Using a vaporizer along with a menthol rub at night  may also help.   · An expectorant containing guaifenesin may help thin the mucus and promote drainage from the sinuses.  · Over-the-counter decongestants may be used unless a similar medicine was prescribed. Nasal sprays work the fastest. Use one that contains phenylephrine or oxymetazoline. First blow the nose gently. Then use the spray. Do not use these medicines more often than directed on the label or symptoms may get worse. You may also use tablets containing pseudoephedrine. Avoid products that combine ingredients, because side effects may be increased. Read labels. You can also ask the pharmacist for help. (NOTE: Persons with high blood pressure should not use decongestants. They can raise blood pressure.)  · Over-the-counter antihistamines may help if allergies contributed to your sinusitis.    · Do not use nasal rinses or irrigation during an acute sinus infection, unless told to by your health care provider. Rinsing may spread the infection to other sinuses.  · Use acetaminophen or ibuprofen to control pain, unless another pain medicine was prescribed. (If you have chronic liver or kidney disease or ever had a stomach ulcer, talk with your doctor before using these medicines. Aspirin should never be used in anyone under 18 years of age who is ill with a fever. It may cause severe liver damage.)  · Don't smoke. This can worsen symptoms.  Follow-up care  Follow up with your healthcare provider or our staff if you are not improving within the next week.  When to seek medical advice  Call your healthcare provider if any of these occur:  · Facial pain or headache becoming more severe  · Stiff neck  · Unusual drowsiness or confusion  · Swelling of the forehead or eyelids  · Vision problems, including blurred or double vision  · Fever of 100.4ºF (38ºC) or higher, or as directed by your healthcare provider  · Seizure  · Breathing problems  · Symptoms not resolving within 10 days  Date Last Reviewed: 4/13/2015  ©  "3834-8200 [employer]. [address] [place] [address], [place] [address]. All rights reserved. This information is not intended as a substitute for professional medical care. Always follow your healthcare professional's instructions.                                                                    Sinusitis   If your condition worsens or fails to improve we recommend that you receive another evaluation at the ER immediately or contact your PCP to discuss your concerns or return here. You must understand that you've received an urgent care treatment only and that you may be released before all your medical problems are known or treated. You the patient will arrange for followup care as instructed.   If we discussed that I think your illness is viral it will not respond to antibiotics and it will last 10-14 days. However, if over the next few days the symptoms worsen start the antibiotics I have given you.   If we discussed that you require antibiotics start them now and take them to completion.   If you are female and on BCP and do take the antibiotics, use additional methods to prevent pregnancy while on the antibiotics and for one cycle after.   Flonase (fluticasone) is a nasal spray which is available over the counter and may help with your symptoms   Zyrtec D, Claritin D or allegra D can also help with symptoms of congestion and drainage.   If you have hypertension avoid using the "D" which is the decongestant   If you just have drainage you can take plain zyrtec, claritin or allegra   If you just have a congested feeling you can take pseudoephedrine (unless you have high blood pressure) which you have to sign for behind the counter. Do not buy the phenylephrine which is on the shelf as it is not effective   Rest and fluids are also important.   Tylenol or ibuprofen can also be used as directed for pain unless you have an allergy to them or medical condition such as stomach ulcers, kidney or liver disease " or blood thinners etc for which you should not be taking these type of medications.   If you are flying in the next few days Afrin nose drops for the airplane flight upon take off and landing may help. Other than at those times refrain from using afrin.   If you were prescribed a narcotic do not drive or operate heavy machinery while taking these medications.

## 2020-11-10 NOTE — PATIENT INSTRUCTIONS
Sinusitis (Antibiotic Treatment)    The sinuses are air-filled spaces within the bones of the face. They connect to the inside of the nose. Sinusitis is an inflammation of the tissue lining the sinus cavity. Sinus inflammation can occur during a cold. It can also be due to allergies to pollens and other particles in the air. Sinusitis can cause symptoms of sinus congestion and fullness. A sinus infection causes fever, headache and facial pain. There is often green or yellow drainage from the nose or into the back of the throat (post-nasal drip). You have been given antibiotics to treat this condition.  Home care:  · Take the full course of antibiotics as instructed. Do not stop taking them, even if you feel better.  · Drink plenty of water, hot tea, and other liquids. This may help thin mucus. It also may promote sinus drainage.  · Heat may help soothe painful areas of the face. Use a towel soaked in hot water. Or,  the shower and direct the hot spray onto your face. Using a vaporizer along with a menthol rub at night may also help.   · An expectorant containing guaifenesin may help thin the mucus and promote drainage from the sinuses.  · Over-the-counter decongestants may be used unless a similar medicine was prescribed. Nasal sprays work the fastest. Use one that contains phenylephrine or oxymetazoline. First blow the nose gently. Then use the spray. Do not use these medicines more often than directed on the label or symptoms may get worse. You may also use tablets containing pseudoephedrine. Avoid products that combine ingredients, because side effects may be increased. Read labels. You can also ask the pharmacist for help. (NOTE: Persons with high blood pressure should not use decongestants. They can raise blood pressure.)  · Over-the-counter antihistamines may help if allergies contributed to your sinusitis.    · Do not use nasal rinses or irrigation during an acute sinus infection, unless told to by  your health care provider. Rinsing may spread the infection to other sinuses.  · Use acetaminophen or ibuprofen to control pain, unless another pain medicine was prescribed. (If you have chronic liver or kidney disease or ever had a stomach ulcer, talk with your doctor before using these medicines. Aspirin should never be used in anyone under 18 years of age who is ill with a fever. It may cause severe liver damage.)  · Don't smoke. This can worsen symptoms.  Follow-up care  Follow up with your healthcare provider or our staff if you are not improving within the next week.  When to seek medical advice  Call your healthcare provider if any of these occur:  · Facial pain or headache becoming more severe  · Stiff neck  · Unusual drowsiness or confusion  · Swelling of the forehead or eyelids  · Vision problems, including blurred or double vision  · Fever of 100.4ºF (38ºC) or higher, or as directed by your healthcare provider  · Seizure  · Breathing problems  · Symptoms not resolving within 10 days  Date Last Reviewed: 4/13/2015  © 3879-7771 [employer]. [address] [place] [address]. All rights reserved. This information is not intended as a substitute for professional medical care. Always follow your healthcare professional's instructions.                                                                    Sinusitis   If your condition worsens or fails to improve we recommend that you receive another evaluation at the ER immediately or contact your PCP to discuss your concerns or return here. You must understand that you've received an urgent care treatment only and that you may be released before all your medical problems are known or treated. You the patient will arrange for followup care as instructed.   If we discussed that I think your illness is viral it will not respond to antibiotics and it will last 10-14 days. However, if over the next few days the symptoms worsen start the  "antibiotics I have given you.   If we discussed that you require antibiotics start them now and take them to completion.   If you are female and on BCP and do take the antibiotics, use additional methods to prevent pregnancy while on the antibiotics and for one cycle after.   Flonase (fluticasone) is a nasal spray which is available over the counter and may help with your symptoms   Zyrtec D, Claritin D or allegra D can also help with symptoms of congestion and drainage.   If you have hypertension avoid using the "D" which is the decongestant   If you just have drainage you can take plain zyrtec, claritin or allegra   If you just have a congested feeling you can take pseudoephedrine (unless you have high blood pressure) which you have to sign for behind the counter. Do not buy the phenylephrine which is on the shelf as it is not effective   Rest and fluids are also important.   Tylenol or ibuprofen can also be used as directed for pain unless you have an allergy to them or medical condition such as stomach ulcers, kidney or liver disease or blood thinners etc for which you should not be taking these type of medications.   If you are flying in the next few days Afrin nose drops for the airplane flight upon take off and landing may help. Other than at those times refrain from using afrin.   If you were prescribed a narcotic do not drive or operate heavy machinery while taking these medications.       "

## 2021-08-03 ENCOUNTER — TELEPHONE (OUTPATIENT)
Dept: INTERNAL MEDICINE | Facility: CLINIC | Age: 41
End: 2021-08-03

## 2021-08-03 DIAGNOSIS — Z00.00 ROUTINE GENERAL MEDICAL EXAMINATION AT A HEALTH CARE FACILITY: Primary | ICD-10-CM

## 2021-08-03 DIAGNOSIS — Z01.84 ENCOUNTER FOR ANTIBODY RESPONSE EXAMINATION: ICD-10-CM

## 2021-08-05 ENCOUNTER — INFUSION (OUTPATIENT)
Dept: INFECTIOUS DISEASES | Facility: HOSPITAL | Age: 41
End: 2021-08-05
Attending: EMERGENCY MEDICINE
Payer: COMMERCIAL

## 2021-08-05 VITALS
DIASTOLIC BLOOD PRESSURE: 92 MMHG | HEART RATE: 84 BPM | TEMPERATURE: 100 F | SYSTOLIC BLOOD PRESSURE: 148 MMHG | RESPIRATION RATE: 20 BRPM | OXYGEN SATURATION: 99 %

## 2021-08-05 DIAGNOSIS — U07.1 COVID-19: Primary | ICD-10-CM

## 2021-08-05 DIAGNOSIS — U07.1 COVID-19: ICD-10-CM

## 2021-08-05 PROCEDURE — 25000003 PHARM REV CODE 250: Performed by: INTERNAL MEDICINE

## 2021-08-05 PROCEDURE — M0243 CASIRIVI AND IMDEVI INFUSION: HCPCS | Performed by: INTERNAL MEDICINE

## 2021-08-05 PROCEDURE — 63600175 PHARM REV CODE 636 W HCPCS: Performed by: INTERNAL MEDICINE

## 2021-08-05 RX ORDER — EPINEPHRINE 0.3 MG/.3ML
0.3 INJECTION SUBCUTANEOUS
Status: DISCONTINUED | OUTPATIENT
Start: 2021-08-05 | End: 2021-08-25

## 2021-08-05 RX ORDER — ALBUTEROL SULFATE 90 UG/1
2 AEROSOL, METERED RESPIRATORY (INHALATION)
Status: DISCONTINUED | OUTPATIENT
Start: 2021-08-05 | End: 2021-08-25

## 2021-08-05 RX ORDER — SODIUM CHLORIDE 0.9 % (FLUSH) 0.9 %
10 SYRINGE (ML) INJECTION
Status: DISCONTINUED | OUTPATIENT
Start: 2021-08-05 | End: 2021-08-25

## 2021-08-05 RX ORDER — ACETAMINOPHEN 325 MG/1
650 TABLET ORAL ONCE AS NEEDED
Status: DISCONTINUED | OUTPATIENT
Start: 2021-08-05 | End: 2021-08-25

## 2021-08-05 RX ORDER — ONDANSETRON 4 MG/1
4 TABLET, ORALLY DISINTEGRATING ORAL ONCE AS NEEDED
Status: DISCONTINUED | OUTPATIENT
Start: 2021-08-05 | End: 2021-08-25

## 2021-08-05 RX ORDER — DIPHENHYDRAMINE HYDROCHLORIDE 50 MG/ML
25 INJECTION INTRAMUSCULAR; INTRAVENOUS ONCE AS NEEDED
Status: DISCONTINUED | OUTPATIENT
Start: 2021-08-05 | End: 2021-08-25

## 2021-08-05 RX ADMIN — CASIRIVIMAB AND IMDEVIMAB 600 MG: 600; 600 INJECTION, SOLUTION, CONCENTRATE INTRAVENOUS at 03:08

## 2021-08-23 PROBLEM — Z00.00 ROUTINE GENERAL MEDICAL EXAMINATION AT A HEALTH CARE FACILITY: Status: ACTIVE | Noted: 2021-08-23

## 2021-08-25 ENCOUNTER — CLINICAL SUPPORT (OUTPATIENT)
Dept: INTERNAL MEDICINE | Facility: CLINIC | Age: 41
End: 2021-08-25
Payer: COMMERCIAL

## 2021-08-25 ENCOUNTER — OFFICE VISIT (OUTPATIENT)
Dept: INTERNAL MEDICINE | Facility: CLINIC | Age: 41
End: 2021-08-25
Payer: COMMERCIAL

## 2021-08-25 ENCOUNTER — HOSPITAL ENCOUNTER (OUTPATIENT)
Dept: CARDIOLOGY | Facility: HOSPITAL | Age: 41
Discharge: HOME | End: 2021-08-25
Attending: FAMILY MEDICINE
Payer: COMMERCIAL

## 2021-08-25 VITALS
HEIGHT: 63 IN | HEART RATE: 67 BPM | BODY MASS INDEX: 26.57 KG/M2 | SYSTOLIC BLOOD PRESSURE: 111 MMHG | RESPIRATION RATE: 16 BRPM | DIASTOLIC BLOOD PRESSURE: 65 MMHG | WEIGHT: 149.94 LBS

## 2021-08-25 DIAGNOSIS — Z12.31 SCREENING MAMMOGRAM, ENCOUNTER FOR: ICD-10-CM

## 2021-08-25 DIAGNOSIS — I49.9 CARDIAC ARRHYTHMIA, UNSPECIFIED CARDIAC ARRHYTHMIA TYPE: ICD-10-CM

## 2021-08-25 DIAGNOSIS — Z00.00 ROUTINE GENERAL MEDICAL EXAMINATION AT A HEALTH CARE FACILITY: ICD-10-CM

## 2021-08-25 DIAGNOSIS — Z12.4 SCREENING FOR MALIGNANT NEOPLASM OF CERVIX: ICD-10-CM

## 2021-08-25 DIAGNOSIS — Z00.00 ROUTINE GENERAL MEDICAL EXAMINATION AT A HEALTH CARE FACILITY: Primary | ICD-10-CM

## 2021-08-25 DIAGNOSIS — Z86.16 HISTORY OF COVID-19: ICD-10-CM

## 2021-08-25 LAB
ALBUMIN SERPL BCP-MCNC: 4.3 G/DL (ref 3.5–5.2)
ALP SERPL-CCNC: 40 U/L (ref 55–135)
ALT SERPL W/O P-5'-P-CCNC: 22 U/L (ref 10–44)
ANION GAP SERPL CALC-SCNC: 10 MMOL/L (ref 8–16)
AST SERPL-CCNC: 20 U/L (ref 10–40)
BILIRUB SERPL-MCNC: 0.8 MG/DL (ref 0.1–1)
BUN SERPL-MCNC: 7 MG/DL (ref 6–20)
CALCIUM SERPL-MCNC: 9.4 MG/DL (ref 8.7–10.5)
CHLORIDE SERPL-SCNC: 104 MMOL/L (ref 95–110)
CHOLEST SERPL-MCNC: 214 MG/DL (ref 120–199)
CHOLEST/HDLC SERPL: 3.1 {RATIO} (ref 2–5)
CO2 SERPL-SCNC: 24 MMOL/L (ref 23–29)
CREAT SERPL-MCNC: 0.8 MG/DL (ref 0.5–1.4)
ERYTHROCYTE [DISTWIDTH] IN BLOOD BY AUTOMATED COUNT: 12.6 % (ref 11.5–14.5)
EST. GFR  (AFRICAN AMERICAN): >60 ML/MIN/1.73 M^2
EST. GFR  (NON AFRICAN AMERICAN): >60 ML/MIN/1.73 M^2
ESTIMATED AVG GLUCOSE: 100 MG/DL (ref 68–131)
GLUCOSE SERPL-MCNC: 91 MG/DL (ref 70–110)
HBA1C MFR BLD: 5.1 % (ref 4–5.6)
HCT VFR BLD AUTO: 40.1 % (ref 37–48.5)
HCV AB SERPL QL IA: NEGATIVE
HDLC SERPL-MCNC: 68 MG/DL (ref 40–75)
HDLC SERPL: 31.8 % (ref 20–50)
HGB BLD-MCNC: 13.3 G/DL (ref 12–16)
LDLC SERPL CALC-MCNC: 126.6 MG/DL (ref 63–159)
MCH RBC QN AUTO: 29 PG (ref 27–31)
MCHC RBC AUTO-ENTMCNC: 33.2 G/DL (ref 32–36)
MCV RBC AUTO: 88 FL (ref 82–98)
NONHDLC SERPL-MCNC: 146 MG/DL
PLATELET # BLD AUTO: 253 K/UL (ref 150–450)
PMV BLD AUTO: 10.2 FL (ref 9.2–12.9)
POTASSIUM SERPL-SCNC: 4.6 MMOL/L (ref 3.5–5.1)
PROT SERPL-MCNC: 7.7 G/DL (ref 6–8.4)
RBC # BLD AUTO: 4.58 M/UL (ref 4–5.4)
SODIUM SERPL-SCNC: 138 MMOL/L (ref 136–145)
T3 SERPL-MCNC: 107 NG/DL (ref 60–180)
T3FREE SERPL-MCNC: 3 PG/ML (ref 2.3–4.2)
T4 FREE SERPL-MCNC: 1 NG/DL (ref 0.71–1.51)
T4 SERPL-MCNC: 9.1 UG/DL (ref 4.5–11.5)
THYROPEROXIDASE IGG SERPL-ACNC: <6 IU/ML
TRIGL SERPL-MCNC: 97 MG/DL (ref 30–150)
TSH SERPL DL<=0.005 MIU/L-ACNC: 1.06 UIU/ML (ref 0.4–4)
WBC # BLD AUTO: 5.08 K/UL (ref 3.9–12.7)

## 2021-08-25 PROCEDURE — 99396 PREV VISIT EST AGE 40-64: CPT | Mod: S$GLB,,, | Performed by: FAMILY MEDICINE

## 2021-08-25 PROCEDURE — 3074F SYST BP LT 130 MM HG: CPT | Mod: CPTII,S$GLB,, | Performed by: FAMILY MEDICINE

## 2021-08-25 PROCEDURE — 3074F PR MOST RECENT SYSTOLIC BLOOD PRESSURE < 130 MM HG: ICD-10-PCS | Mod: CPTII,S$GLB,, | Performed by: FAMILY MEDICINE

## 2021-08-25 PROCEDURE — 1160F RVW MEDS BY RX/DR IN RCRD: CPT | Mod: CPTII,S$GLB,, | Performed by: FAMILY MEDICINE

## 2021-08-25 PROCEDURE — 99999 PR PBB SHADOW E&M-EST. PATIENT-LVL III: ICD-10-PCS | Mod: PBBFAC,,, | Performed by: FAMILY MEDICINE

## 2021-08-25 PROCEDURE — 93010 ELECTROCARDIOGRAM REPORT: CPT | Mod: ,,, | Performed by: INTERNAL MEDICINE

## 2021-08-25 PROCEDURE — 84436 ASSAY OF TOTAL THYROXINE: CPT | Performed by: FAMILY MEDICINE

## 2021-08-25 PROCEDURE — 83036 HEMOGLOBIN GLYCOSYLATED A1C: CPT | Performed by: FAMILY MEDICINE

## 2021-08-25 PROCEDURE — 3008F BODY MASS INDEX DOCD: CPT | Mod: CPTII,S$GLB,, | Performed by: FAMILY MEDICINE

## 2021-08-25 PROCEDURE — 99396 PR PREVENTIVE VISIT,EST,40-64: ICD-10-PCS | Mod: S$GLB,,, | Performed by: FAMILY MEDICINE

## 2021-08-25 PROCEDURE — 3008F PR BODY MASS INDEX (BMI) DOCUMENTED: ICD-10-PCS | Mod: CPTII,S$GLB,, | Performed by: FAMILY MEDICINE

## 2021-08-25 PROCEDURE — 80053 COMPREHEN METABOLIC PANEL: CPT | Performed by: FAMILY MEDICINE

## 2021-08-25 PROCEDURE — 3078F DIAST BP <80 MM HG: CPT | Mod: CPTII,S$GLB,, | Performed by: FAMILY MEDICINE

## 2021-08-25 PROCEDURE — 80061 LIPID PANEL: CPT | Performed by: FAMILY MEDICINE

## 2021-08-25 PROCEDURE — 93010 EKG 12-LEAD: ICD-10-PCS | Mod: ,,, | Performed by: INTERNAL MEDICINE

## 2021-08-25 PROCEDURE — 84443 ASSAY THYROID STIM HORMONE: CPT | Performed by: FAMILY MEDICINE

## 2021-08-25 PROCEDURE — 1159F MED LIST DOCD IN RCRD: CPT | Mod: CPTII,S$GLB,, | Performed by: FAMILY MEDICINE

## 2021-08-25 PROCEDURE — 84481 FREE ASSAY (FT-3): CPT | Performed by: FAMILY MEDICINE

## 2021-08-25 PROCEDURE — 84439 ASSAY OF FREE THYROXINE: CPT | Performed by: FAMILY MEDICINE

## 2021-08-25 PROCEDURE — 86803 HEPATITIS C AB TEST: CPT | Performed by: FAMILY MEDICINE

## 2021-08-25 PROCEDURE — 86376 MICROSOMAL ANTIBODY EACH: CPT | Performed by: FAMILY MEDICINE

## 2021-08-25 PROCEDURE — 1159F PR MEDICATION LIST DOCUMENTED IN MEDICAL RECORD: ICD-10-PCS | Mod: CPTII,S$GLB,, | Performed by: FAMILY MEDICINE

## 2021-08-25 PROCEDURE — 99999 PR PBB SHADOW E&M-EST. PATIENT-LVL III: CPT | Mod: PBBFAC,,, | Performed by: FAMILY MEDICINE

## 2021-08-25 PROCEDURE — 93005 ELECTROCARDIOGRAM TRACING: CPT

## 2021-08-25 PROCEDURE — 84480 ASSAY TRIIODOTHYRONINE (T3): CPT | Performed by: FAMILY MEDICINE

## 2021-08-25 PROCEDURE — 1160F PR REVIEW ALL MEDS BY PRESCRIBER/CLIN PHARMACIST DOCUMENTED: ICD-10-PCS | Mod: CPTII,S$GLB,, | Performed by: FAMILY MEDICINE

## 2021-08-25 PROCEDURE — 85027 COMPLETE CBC AUTOMATED: CPT | Performed by: FAMILY MEDICINE

## 2021-08-25 PROCEDURE — 3078F PR MOST RECENT DIASTOLIC BLOOD PRESSURE < 80 MM HG: ICD-10-PCS | Mod: CPTII,S$GLB,, | Performed by: FAMILY MEDICINE

## 2021-08-31 ENCOUNTER — TELEPHONE (OUTPATIENT)
Dept: PRIMARY CARE CLINIC | Facility: CLINIC | Age: 41
End: 2021-08-31

## 2021-09-21 ENCOUNTER — PATIENT OUTREACH (OUTPATIENT)
Dept: ADMINISTRATIVE | Facility: OTHER | Age: 41
End: 2021-09-21

## 2021-09-21 DIAGNOSIS — Z12.31 ENCOUNTER FOR SCREENING MAMMOGRAM FOR MALIGNANT NEOPLASM OF BREAST: Primary | ICD-10-CM

## 2021-09-22 ENCOUNTER — OFFICE VISIT (OUTPATIENT)
Dept: CARDIOLOGY | Facility: CLINIC | Age: 41
End: 2021-09-22
Payer: COMMERCIAL

## 2021-09-22 VITALS
RESPIRATION RATE: 16 BRPM | DIASTOLIC BLOOD PRESSURE: 86 MMHG | WEIGHT: 156.75 LBS | SYSTOLIC BLOOD PRESSURE: 118 MMHG | HEIGHT: 63 IN | OXYGEN SATURATION: 99 % | BODY MASS INDEX: 27.77 KG/M2 | HEART RATE: 87 BPM

## 2021-09-22 DIAGNOSIS — Z86.16 HISTORY OF COVID-19: ICD-10-CM

## 2021-09-22 DIAGNOSIS — I49.9 CARDIAC ARRHYTHMIA, UNSPECIFIED CARDIAC ARRHYTHMIA TYPE: Primary | ICD-10-CM

## 2021-09-22 PROCEDURE — 3074F PR MOST RECENT SYSTOLIC BLOOD PRESSURE < 130 MM HG: ICD-10-PCS | Mod: CPTII,S$GLB,, | Performed by: INTERNAL MEDICINE

## 2021-09-22 PROCEDURE — 1159F PR MEDICATION LIST DOCUMENTED IN MEDICAL RECORD: ICD-10-PCS | Mod: CPTII,S$GLB,, | Performed by: INTERNAL MEDICINE

## 2021-09-22 PROCEDURE — 99204 OFFICE O/P NEW MOD 45 MIN: CPT | Mod: S$GLB,,, | Performed by: INTERNAL MEDICINE

## 2021-09-22 PROCEDURE — 3044F PR MOST RECENT HEMOGLOBIN A1C LEVEL <7.0%: ICD-10-PCS | Mod: CPTII,S$GLB,, | Performed by: INTERNAL MEDICINE

## 2021-09-22 PROCEDURE — 3044F HG A1C LEVEL LT 7.0%: CPT | Mod: CPTII,S$GLB,, | Performed by: INTERNAL MEDICINE

## 2021-09-22 PROCEDURE — 3079F PR MOST RECENT DIASTOLIC BLOOD PRESSURE 80-89 MM HG: ICD-10-PCS | Mod: CPTII,S$GLB,, | Performed by: INTERNAL MEDICINE

## 2021-09-22 PROCEDURE — 1159F MED LIST DOCD IN RCRD: CPT | Mod: CPTII,S$GLB,, | Performed by: INTERNAL MEDICINE

## 2021-09-22 PROCEDURE — 1160F RVW MEDS BY RX/DR IN RCRD: CPT | Mod: CPTII,S$GLB,, | Performed by: INTERNAL MEDICINE

## 2021-09-22 PROCEDURE — 3074F SYST BP LT 130 MM HG: CPT | Mod: CPTII,S$GLB,, | Performed by: INTERNAL MEDICINE

## 2021-09-22 PROCEDURE — 99204 PR OFFICE/OUTPT VISIT, NEW, LEVL IV, 45-59 MIN: ICD-10-PCS | Mod: S$GLB,,, | Performed by: INTERNAL MEDICINE

## 2021-09-22 PROCEDURE — 3079F DIAST BP 80-89 MM HG: CPT | Mod: CPTII,S$GLB,, | Performed by: INTERNAL MEDICINE

## 2021-09-22 PROCEDURE — 1160F PR REVIEW ALL MEDS BY PRESCRIBER/CLIN PHARMACIST DOCUMENTED: ICD-10-PCS | Mod: CPTII,S$GLB,, | Performed by: INTERNAL MEDICINE

## 2021-09-22 PROCEDURE — 99999 PR PBB SHADOW E&M-EST. PATIENT-LVL IV: CPT | Mod: PBBFAC,,, | Performed by: INTERNAL MEDICINE

## 2021-09-22 PROCEDURE — 3008F BODY MASS INDEX DOCD: CPT | Mod: CPTII,S$GLB,, | Performed by: INTERNAL MEDICINE

## 2021-09-22 PROCEDURE — 3008F PR BODY MASS INDEX (BMI) DOCUMENTED: ICD-10-PCS | Mod: CPTII,S$GLB,, | Performed by: INTERNAL MEDICINE

## 2021-09-22 PROCEDURE — 99999 PR PBB SHADOW E&M-EST. PATIENT-LVL IV: ICD-10-PCS | Mod: PBBFAC,,, | Performed by: INTERNAL MEDICINE

## 2021-10-25 ENCOUNTER — HOSPITAL ENCOUNTER (OUTPATIENT)
Dept: CARDIOLOGY | Facility: HOSPITAL | Age: 41
Discharge: HOME | End: 2021-10-25
Attending: INTERNAL MEDICINE
Payer: COMMERCIAL

## 2021-10-25 VITALS — HEIGHT: 63 IN | WEIGHT: 156 LBS | BODY MASS INDEX: 27.64 KG/M2

## 2021-10-25 DIAGNOSIS — Z86.16 HISTORY OF COVID-19: ICD-10-CM

## 2021-10-25 DIAGNOSIS — I49.9 CARDIAC ARRHYTHMIA, UNSPECIFIED CARDIAC ARRHYTHMIA TYPE: ICD-10-CM

## 2021-10-25 LAB
AORTIC ROOT ANNULUS: 2.98 CM
AV INDEX (PROSTH): 0.78
AV MEAN GRADIENT: 4 MMHG
AV PEAK GRADIENT: 7 MMHG
AV VALVE AREA: 2.46 CM2
AV VELOCITY RATIO: 0.82
BSA FOR ECHO PROCEDURE: 1.77 M2
CV ECHO LV RWT: 0.43 CM
DOP CALC AO PEAK VEL: 1.36 M/S
DOP CALC AO VTI: 32.3 CM
DOP CALC LVOT AREA: 3.1 CM2
DOP CALC LVOT DIAMETER: 2 CM
DOP CALC LVOT PEAK VEL: 1.11 M/S
DOP CALC LVOT STROKE VOLUME: 79.54 CM3
DOP CALC MV VTI: 25.33 CM
DOP CALC RVOT PEAK VEL: 0.75 M/S
DOP CALC RVOT VTI: 17 CM
DOP CALCLVOT PEAK VEL VTI: 25.33 CM
E WAVE DECELERATION TIME: 208.92 MSEC
E/A RATIO: 1.75
E/E' RATIO: 6.44 M/S
ECHO LV POSTERIOR WALL: 0.9 CM (ref 0.6–1.1)
EJECTION FRACTION: 60 %
FRACTIONAL SHORTENING: 36 % (ref 28–44)
INTERVENTRICULAR SEPTUM: 0.91 CM (ref 0.6–1.1)
IVRT: 91.34 MSEC
LA MAJOR: 4.56 CM
LA MINOR: 3.86 CM
LA WIDTH: 4 CM
LEFT ATRIUM SIZE: 3.05 CM
LEFT ATRIUM VOLUME INDEX: 24.9 ML/M2
LEFT ATRIUM VOLUME: 43.36 CM3
LEFT INTERNAL DIMENSION IN SYSTOLE: 2.69 CM (ref 2.1–4)
LEFT VENTRICLE DIASTOLIC VOLUME INDEX: 45.71 ML/M2
LEFT VENTRICLE DIASTOLIC VOLUME: 79.54 ML
LEFT VENTRICLE MASS INDEX: 69 G/M2
LEFT VENTRICLE SYSTOLIC VOLUME INDEX: 15.4 ML/M2
LEFT VENTRICLE SYSTOLIC VOLUME: 26.81 ML
LEFT VENTRICULAR INTERNAL DIMENSION IN DIASTOLE: 4.22 CM (ref 3.5–6)
LEFT VENTRICULAR MASS: 120.5 G
LV LATERAL E/E' RATIO: 6.06 M/S
LV SEPTAL E/E' RATIO: 6.87 M/S
MV A" WAVE DURATION": 10.85 MSEC
MV MEAN GRADIENT: 1 MMHG
MV PEAK A VEL: 0.59 M/S
MV PEAK E VEL: 1.03 M/S
MV PEAK GRADIENT: 4 MMHG
MV VALVE AREA BY CONTINUITY EQUATION: 3.14 CM2
PISA TR MAX VEL: 2.86 M/S
PULM VEIN S/D RATIO: 1.31
PV MEAN GRADIENT: 1 MMHG
PV PEAK D VEL: 0.48 M/S
PV PEAK S VEL: 0.63 M/S
PV PEAK VELOCITY: 0.89 CM/S
RA MAJOR: 4.4 CM
RA PRESSURE: 3 MMHG
RA WIDTH: 2.82 CM
RIGHT VENTRICULAR END-DIASTOLIC DIMENSION: 2.52 CM
SINUS: 2.51 CM
STJ: 2.72 CM
TDI LATERAL: 0.17 M/S
TDI SEPTAL: 0.15 M/S
TDI: 0.16 M/S
TR MAX PG: 33 MMHG
TRICUSPID ANNULAR PLANE SYSTOLIC EXCURSION: 1.87 CM
TV REST PULMONARY ARTERY PRESSURE: 36 MMHG

## 2021-10-25 PROCEDURE — 93306 TTE W/DOPPLER COMPLETE: CPT

## 2021-10-25 PROCEDURE — 93306 ECHO (CUPID ONLY): ICD-10-PCS | Mod: 26,,, | Performed by: INTERNAL MEDICINE

## 2021-10-25 PROCEDURE — 93306 TTE W/DOPPLER COMPLETE: CPT | Mod: 26,,, | Performed by: INTERNAL MEDICINE

## 2021-11-22 PROBLEM — Z00.00 ROUTINE GENERAL MEDICAL EXAMINATION AT A HEALTH CARE FACILITY: Status: RESOLVED | Noted: 2021-08-23 | Resolved: 2021-11-22

## 2021-12-21 ENCOUNTER — PATIENT OUTREACH (OUTPATIENT)
Dept: ADMINISTRATIVE | Facility: OTHER | Age: 41
End: 2021-12-21
Payer: COMMERCIAL

## 2022-01-21 DIAGNOSIS — I49.9 CARDIAC ARRHYTHMIA, UNSPECIFIED CARDIAC ARRHYTHMIA TYPE: Primary | ICD-10-CM

## 2022-01-26 ENCOUNTER — PATIENT OUTREACH (OUTPATIENT)
Dept: ADMINISTRATIVE | Facility: OTHER | Age: 42
End: 2022-01-26
Payer: COMMERCIAL

## 2022-01-27 ENCOUNTER — OFFICE VISIT (OUTPATIENT)
Dept: CARDIOLOGY | Facility: CLINIC | Age: 42
End: 2022-01-27
Payer: COMMERCIAL

## 2022-01-27 ENCOUNTER — HOSPITAL ENCOUNTER (OUTPATIENT)
Dept: CARDIOLOGY | Facility: HOSPITAL | Age: 42
Discharge: HOME | End: 2022-01-27
Attending: INTERNAL MEDICINE
Payer: COMMERCIAL

## 2022-01-27 VITALS
HEART RATE: 67 BPM | BODY MASS INDEX: 27.55 KG/M2 | HEIGHT: 64 IN | DIASTOLIC BLOOD PRESSURE: 86 MMHG | OXYGEN SATURATION: 99 % | RESPIRATION RATE: 16 BRPM | WEIGHT: 161.38 LBS | SYSTOLIC BLOOD PRESSURE: 126 MMHG

## 2022-01-27 DIAGNOSIS — I49.9 CARDIAC ARRHYTHMIA, UNSPECIFIED CARDIAC ARRHYTHMIA TYPE: ICD-10-CM

## 2022-01-27 DIAGNOSIS — I49.9 CARDIAC ARRHYTHMIA, UNSPECIFIED CARDIAC ARRHYTHMIA TYPE: Primary | ICD-10-CM

## 2022-01-27 DIAGNOSIS — Z86.16 HISTORY OF COVID-19: ICD-10-CM

## 2022-01-27 PROCEDURE — 1160F PR REVIEW ALL MEDS BY PRESCRIBER/CLIN PHARMACIST DOCUMENTED: ICD-10-PCS | Mod: CPTII,S$GLB,, | Performed by: INTERNAL MEDICINE

## 2022-01-27 PROCEDURE — 3008F BODY MASS INDEX DOCD: CPT | Mod: CPTII,S$GLB,, | Performed by: INTERNAL MEDICINE

## 2022-01-27 PROCEDURE — 3079F PR MOST RECENT DIASTOLIC BLOOD PRESSURE 80-89 MM HG: ICD-10-PCS | Mod: CPTII,S$GLB,, | Performed by: INTERNAL MEDICINE

## 2022-01-27 PROCEDURE — 99214 OFFICE O/P EST MOD 30 MIN: CPT | Mod: S$GLB,,, | Performed by: INTERNAL MEDICINE

## 2022-01-27 PROCEDURE — 3074F SYST BP LT 130 MM HG: CPT | Mod: CPTII,S$GLB,, | Performed by: INTERNAL MEDICINE

## 2022-01-27 PROCEDURE — 1159F PR MEDICATION LIST DOCUMENTED IN MEDICAL RECORD: ICD-10-PCS | Mod: CPTII,S$GLB,, | Performed by: INTERNAL MEDICINE

## 2022-01-27 PROCEDURE — 3074F PR MOST RECENT SYSTOLIC BLOOD PRESSURE < 130 MM HG: ICD-10-PCS | Mod: CPTII,S$GLB,, | Performed by: INTERNAL MEDICINE

## 2022-01-27 PROCEDURE — 93005 ELECTROCARDIOGRAM TRACING: CPT | Mod: PO

## 2022-01-27 PROCEDURE — 1160F RVW MEDS BY RX/DR IN RCRD: CPT | Mod: CPTII,S$GLB,, | Performed by: INTERNAL MEDICINE

## 2022-01-27 PROCEDURE — 3008F PR BODY MASS INDEX (BMI) DOCUMENTED: ICD-10-PCS | Mod: CPTII,S$GLB,, | Performed by: INTERNAL MEDICINE

## 2022-01-27 PROCEDURE — 3079F DIAST BP 80-89 MM HG: CPT | Mod: CPTII,S$GLB,, | Performed by: INTERNAL MEDICINE

## 2022-01-27 PROCEDURE — 99999 PR PBB SHADOW E&M-EST. PATIENT-LVL IV: CPT | Mod: PBBFAC,,, | Performed by: INTERNAL MEDICINE

## 2022-01-27 PROCEDURE — 99999 PR PBB SHADOW E&M-EST. PATIENT-LVL IV: ICD-10-PCS | Mod: PBBFAC,,, | Performed by: INTERNAL MEDICINE

## 2022-01-27 PROCEDURE — 1159F MED LIST DOCD IN RCRD: CPT | Mod: CPTII,S$GLB,, | Performed by: INTERNAL MEDICINE

## 2022-01-27 PROCEDURE — 99214 PR OFFICE/OUTPT VISIT, EST, LEVL IV, 30-39 MIN: ICD-10-PCS | Mod: S$GLB,,, | Performed by: INTERNAL MEDICINE

## 2022-01-27 PROCEDURE — 93010 ELECTROCARDIOGRAM REPORT: CPT | Mod: ,,, | Performed by: INTERNAL MEDICINE

## 2022-01-27 PROCEDURE — 93010 EKG 12-LEAD: ICD-10-PCS | Mod: ,,, | Performed by: INTERNAL MEDICINE

## 2022-01-27 NOTE — PROGRESS NOTES
Subjective:   Patient ID:  Dominga Contreras is a 41 y.o. female who presents for cardiac consult of Follow-up (For labs)    Referring Physician: Brandon Sow MD   [address] [place] [address]    Reason for consult: cardiac arrhythmia       HPI  The patient came in today for cardiac consult of Follow-up (For labs)      Dominga Contreras is a 41 y.o. female pt with h/o COVID 19, HLD, h/o lung nodules presents for follow up CV eval.     21  Pt had recent ECG with marked sinus arrhythmia otherwise NSR. Thyroid studies are neg. She had Calcium score zero last year but CT also positive for small lung nodule. BP and HR well controlled. She had COVID beg of Aug 2021.     22  ECHO with normal bi V function, mild IL, TR, PASP 36 mmHg. BP and HR stable today.   ECG - NSR, sinus arrythmia, TWI anteriorly     Patient feels no chest pain, no sob, no leg swelling, no PND, no palpitation, no dizziness, no syncope, no CNS symptoms.    Patient has fairly good exercise tolerance. Works in [employer] for [employer];     Patient is compliant with medications.    FH - mother - has arrhythmia, unsure; maternal GF -  of MI ' 50s; paternal GF -  of 70s    Results for orders placed during the hospital encounter of 10/25/21    Echo    Interpretation Summary  · The left ventricle is normal in size with concentric remodeling and normal systolic function.  · The estimated ejection fraction is 60%.  · Normal left ventricular diastolic function.  · Normal right ventricular size with normal right ventricular systolic function.  · Mild pulmonic regurgitation.  · Mild tricuspid regurgitation.  · Normal central venous pressure (3 mmHg).  · The estimated PA systolic pressure is 36 mmHg.      ECG  Sinus rhythm with marked sinus arrythmia   Otherwise normal ECG   When compared with ECG of 09-AUG-2019 08:18,   Previous ECG has undetermined rhythm, needs review   Confirmed by JACKY BATES, CHONG ([id number]) on 2021 10:10:18  "PM     History reviewed. No pertinent past medical history.    Past Surgical History:   Procedure Laterality Date    removal of blood clot       lower back       Social History     Tobacco Use    Smoking status: Never Smoker    Smokeless tobacco: Never Used   Substance Use Topics    Alcohol use: Yes     Comment: occasionally     Drug use: No       Family History   Problem Relation Age of Onset    Skin cancer Mother     Diabetes Father     Skin cancer Father     Heart disease Maternal Grandfather     Heart disease Paternal Grandfather        Patient's Medications   New Prescriptions    No medications on file   Previous Medications    PEDI MULTIVIT NO.12 W-FLUORIDE (MULTIVITAMINS-FLUORIDE-FOLIC A ORAL)       Modified Medications    No medications on file   Discontinued Medications    No medications on file       Review of Systems   Constitutional: Negative.    HENT: Negative.    Eyes: Negative.    Respiratory: Negative.    Cardiovascular: Negative.    Gastrointestinal: Negative.    Genitourinary: Negative.    Musculoskeletal: Negative.    Skin: Negative.    Neurological: Negative.    Endo/Heme/Allergies: Negative.    Psychiatric/Behavioral: Negative.    All 12 systems otherwise negative.      Wt Readings from Last 3 Encounters:   01/27/22 73.2 kg (161 lb 6 oz)   12/08/21 71.2 kg (157 lb)   10/25/21 70.8 kg (156 lb)     Temp Readings from Last 3 Encounters:   08/05/21 99.6 °F (37.6 °C) (Tympanic)   11/10/20 98.8 °F (37.1 °C) (Temporal)   08/12/20 99.5 °F (37.5 °C) (Tympanic)     BP Readings from Last 3 Encounters:   01/27/22 126/86   12/08/21 130/84   09/22/21 118/86     Pulse Readings from Last 3 Encounters:   01/27/22 67   09/22/21 87   08/25/21 67       /86 (BP Location: Right arm, Patient Position: Sitting, BP Method: Medium (Manual))   Pulse 67   Resp 16   Ht 5' 4" (1.626 m)   Wt 73.2 kg (161 lb 6 oz)   LMP 01/10/2022 (Exact Date)   SpO2 99%   BMI 27.70 kg/m²     Objective:   Physical " Exam  Vitals and nursing note reviewed.   Constitutional:       General: She is not in acute distress.     Appearance: She is well-developed. She is not diaphoretic.   HENT:      Head: Normocephalic and atraumatic.      Nose: Nose normal.   Eyes:      General: No scleral icterus.     Conjunctiva/sclera: Conjunctivae normal.   Neck:      Thyroid: No thyromegaly.      Vascular: No JVD.   Cardiovascular:      Rate and Rhythm: Normal rate and regular rhythm.      Heart sounds: S1 normal and S2 normal. No murmur heard.  No friction rub. No gallop. No S3 or S4 sounds.    Pulmonary:      Effort: Pulmonary effort is normal. No respiratory distress.      Breath sounds: Normal breath sounds. No stridor. No wheezing or rales.   Chest:      Chest wall: No tenderness.   Abdominal:      General: Bowel sounds are normal. There is no distension.      Palpations: Abdomen is soft. There is no mass.      Tenderness: There is no abdominal tenderness. There is no rebound.   Genitourinary:     Comments: Deferred  Musculoskeletal:         General: No tenderness or deformity. Normal range of motion.      Cervical back: Normal range of motion and neck supple.   Lymphadenopathy:      Cervical: No cervical adenopathy.   Skin:     General: Skin is warm and dry.      Coloration: Skin is not pale.      Findings: No erythema or rash.   Neurological:      Mental Status: She is alert and oriented to person, place, and time.      Motor: No abnormal muscle tone.      Coordination: Coordination normal.   Psychiatric:         Behavior: Behavior normal.         Thought Content: Thought content normal.         Judgment: Judgment normal.         Lab Results   Component Value Date     08/25/2021    K 4.6 08/25/2021     08/25/2021    CO2 24 08/25/2021    BUN 7 08/25/2021    CREATININE 0.8 08/25/2021    GLU 91 08/25/2021    HGBA1C 5.1 08/25/2021    AST 20 08/25/2021    ALT 22 08/25/2021    ALBUMIN 4.3 08/25/2021    PROT 7.7 08/25/2021    BILITOT  0.8 08/25/2021    WBC 5.08 08/25/2021    HGB 13.3 08/25/2021    HCT 40.1 08/25/2021    MCV 88 08/25/2021     08/25/2021    TSH 1.056 08/25/2021    CHOL 214 (H) 08/25/2021    HDL 68 08/25/2021    LDLCALC 126.6 08/25/2021    TRIG 97 08/25/2021     Assessment:      1. Cardiac arrhythmia, unspecified cardiac arrhythmia type    2. History of COVID-19    3. Lung nodule < 6cm on CT        Plan:   1. Marked sinus arrythmia, recent on ECG; FH CAD  - thyroid studies neg  - neg metabolic panel  - ECHO with normal bi V function, mild ID, TR, PASP 36 mmHg.     2. H/O COVID 19  - stable now    3. H/O Lung Nodule with PASP 36 mmHg - mild pHTN  - cont to monitor per PCP/pulm  - CT last year with nodule 2020    Thank you for allowing me to participate in this patient's care. Please do not hesitate to contact me with any questions or concerns. Consult note has been forwarded to the referral physician.

## 2022-02-09 ENCOUNTER — PATIENT MESSAGE (OUTPATIENT)
Dept: PRIMARY CARE CLINIC | Facility: CLINIC | Age: 42
End: 2022-02-09
Payer: COMMERCIAL

## 2022-03-02 ENCOUNTER — PATIENT MESSAGE (OUTPATIENT)
Dept: PRIMARY CARE CLINIC | Facility: CLINIC | Age: 42
End: 2022-03-02
Payer: COMMERCIAL

## 2022-07-28 DIAGNOSIS — Z13.6 ENCOUNTER FOR SCREENING FOR CARDIOVASCULAR DISORDERS: ICD-10-CM

## 2022-09-30 ENCOUNTER — OFFICE VISIT (OUTPATIENT)
Dept: INTERNAL MEDICINE | Facility: CLINIC | Age: 42
End: 2022-09-30
Payer: COMMERCIAL

## 2022-09-30 ENCOUNTER — HOSPITAL ENCOUNTER (OUTPATIENT)
Dept: RADIOLOGY | Facility: HOSPITAL | Age: 42
Discharge: HOME | End: 2022-09-30
Attending: FAMILY MEDICINE
Payer: COMMERCIAL

## 2022-09-30 ENCOUNTER — CLINICAL SUPPORT (OUTPATIENT)
Dept: INTERNAL MEDICINE | Facility: CLINIC | Age: 42
End: 2022-09-30
Payer: COMMERCIAL

## 2022-09-30 VITALS
HEART RATE: 73 BPM | SYSTOLIC BLOOD PRESSURE: 126 MMHG | DIASTOLIC BLOOD PRESSURE: 73 MMHG | TEMPERATURE: 99 F | OXYGEN SATURATION: 99 % | BODY MASS INDEX: 28 KG/M2 | WEIGHT: 163.13 LBS

## 2022-09-30 DIAGNOSIS — E04.1 THYROID NODULE: ICD-10-CM

## 2022-09-30 DIAGNOSIS — Z23 NEED FOR INFLUENZA VACCINATION: ICD-10-CM

## 2022-09-30 DIAGNOSIS — I49.8 SINUS ARRHYTHMIA: Chronic | ICD-10-CM

## 2022-09-30 DIAGNOSIS — Z00.00 ROUTINE GENERAL MEDICAL EXAMINATION AT A HEALTH CARE FACILITY: Primary | ICD-10-CM

## 2022-09-30 DIAGNOSIS — Z13.6 ENCOUNTER FOR SCREENING FOR CARDIOVASCULAR DISORDERS: ICD-10-CM

## 2022-09-30 DIAGNOSIS — R91.1 LUNG NODULE: ICD-10-CM

## 2022-09-30 DIAGNOSIS — Z23 NEED FOR COVID-19 VACCINE: ICD-10-CM

## 2022-09-30 DIAGNOSIS — Z00.00 PREVENTATIVE HEALTH CARE: Primary | ICD-10-CM

## 2022-09-30 DIAGNOSIS — Z23 NEED FOR DIPHTHERIA-TETANUS-PERTUSSIS (TDAP) VACCINE: ICD-10-CM

## 2022-09-30 PROBLEM — Z28.21 IMMUNIZATION NOT CARRIED OUT BECAUSE OF PATIENT REFUSAL: Status: ACTIVE | Noted: 2022-09-30

## 2022-09-30 PROBLEM — Z86.16 HISTORY OF COVID-19: Status: RESOLVED | Noted: 2021-08-25 | Resolved: 2022-09-30

## 2022-09-30 LAB
ALBUMIN SERPL BCP-MCNC: 4.6 G/DL (ref 3.5–5.2)
ALP SERPL-CCNC: 41 U/L (ref 55–135)
ALT SERPL W/O P-5'-P-CCNC: 17 U/L (ref 10–44)
ANION GAP SERPL CALC-SCNC: 12 MMOL/L (ref 8–16)
AST SERPL-CCNC: 18 U/L (ref 10–40)
BILIRUB SERPL-MCNC: 0.6 MG/DL (ref 0.1–1)
BUN SERPL-MCNC: 9 MG/DL (ref 6–20)
CALCIUM SERPL-MCNC: 9.6 MG/DL (ref 8.7–10.5)
CHLORIDE SERPL-SCNC: 105 MMOL/L (ref 95–110)
CHOLEST SERPL-MCNC: 226 MG/DL (ref 120–199)
CHOLEST/HDLC SERPL: 3.1 {RATIO} (ref 2–5)
CO2 SERPL-SCNC: 22 MMOL/L (ref 23–29)
CREAT SERPL-MCNC: 0.7 MG/DL (ref 0.5–1.4)
ERYTHROCYTE [DISTWIDTH] IN BLOOD BY AUTOMATED COUNT: 12.6 % (ref 11.5–14.5)
EST. GFR  (NO RACE VARIABLE): >60 ML/MIN/1.73 M^2
ESTIMATED AVG GLUCOSE: 94 MG/DL (ref 68–131)
GLUCOSE SERPL-MCNC: 85 MG/DL (ref 70–110)
HBA1C MFR BLD: 4.9 % (ref 4–5.6)
HCT VFR BLD AUTO: 42.3 % (ref 37–48.5)
HDLC SERPL-MCNC: 74 MG/DL (ref 40–75)
HDLC SERPL: 32.7 % (ref 20–50)
HGB BLD-MCNC: 14.1 G/DL (ref 12–16)
LDLC SERPL CALC-MCNC: 137.8 MG/DL (ref 63–159)
MCH RBC QN AUTO: 29 PG (ref 27–31)
MCHC RBC AUTO-ENTMCNC: 33.3 G/DL (ref 32–36)
MCV RBC AUTO: 87 FL (ref 82–98)
NONHDLC SERPL-MCNC: 152 MG/DL
PLATELET # BLD AUTO: 279 K/UL (ref 150–450)
PMV BLD AUTO: 9.8 FL (ref 9.2–12.9)
POTASSIUM SERPL-SCNC: 4.3 MMOL/L (ref 3.5–5.1)
PROT SERPL-MCNC: 7.8 G/DL (ref 6–8.4)
RBC # BLD AUTO: 4.87 M/UL (ref 4–5.4)
SODIUM SERPL-SCNC: 139 MMOL/L (ref 136–145)
TRIGL SERPL-MCNC: 71 MG/DL (ref 30–150)
TSH SERPL DL<=0.005 MIU/L-ACNC: 1.57 UIU/ML (ref 0.4–4)
WBC # BLD AUTO: 6.43 K/UL (ref 3.9–12.7)

## 2022-09-30 PROCEDURE — 3044F PR MOST RECENT HEMOGLOBIN A1C LEVEL <7.0%: ICD-10-PCS | Mod: CPTII,S$GLB,, | Performed by: FAMILY MEDICINE

## 2022-09-30 PROCEDURE — 75571 CT HRT W/O DYE W/CA TEST: CPT | Mod: 26,,, | Performed by: RADIOLOGY

## 2022-09-30 PROCEDURE — 80061 LIPID PANEL: CPT | Performed by: FAMILY MEDICINE

## 2022-09-30 PROCEDURE — 3074F PR MOST RECENT SYSTOLIC BLOOD PRESSURE < 130 MM HG: ICD-10-PCS | Mod: CPTII,S$GLB,, | Performed by: FAMILY MEDICINE

## 2022-09-30 PROCEDURE — 83036 HEMOGLOBIN GLYCOSYLATED A1C: CPT | Performed by: FAMILY MEDICINE

## 2022-09-30 PROCEDURE — 3074F SYST BP LT 130 MM HG: CPT | Mod: CPTII,S$GLB,, | Performed by: FAMILY MEDICINE

## 2022-09-30 PROCEDURE — 3008F PR BODY MASS INDEX (BMI) DOCUMENTED: ICD-10-PCS | Mod: CPTII,S$GLB,, | Performed by: FAMILY MEDICINE

## 2022-09-30 PROCEDURE — 99396 PR PREVENTIVE VISIT,EST,40-64: ICD-10-PCS | Mod: S$GLB,,, | Performed by: FAMILY MEDICINE

## 2022-09-30 PROCEDURE — 3078F PR MOST RECENT DIASTOLIC BLOOD PRESSURE < 80 MM HG: ICD-10-PCS | Mod: CPTII,S$GLB,, | Performed by: FAMILY MEDICINE

## 2022-09-30 PROCEDURE — 99999 PR PBB SHADOW E&M-EST. PATIENT-LVL III: CPT | Mod: PBBFAC,,, | Performed by: FAMILY MEDICINE

## 2022-09-30 PROCEDURE — 80053 COMPREHEN METABOLIC PANEL: CPT | Performed by: FAMILY MEDICINE

## 2022-09-30 PROCEDURE — 75571 CT HRT W/O DYE W/CA TEST: CPT | Mod: TC

## 2022-09-30 PROCEDURE — 3008F BODY MASS INDEX DOCD: CPT | Mod: CPTII,S$GLB,, | Performed by: FAMILY MEDICINE

## 2022-09-30 PROCEDURE — 75571 CT CALCIUM SCORING CARDIAC: ICD-10-PCS | Mod: 26,,, | Performed by: RADIOLOGY

## 2022-09-30 PROCEDURE — 3078F DIAST BP <80 MM HG: CPT | Mod: CPTII,S$GLB,, | Performed by: FAMILY MEDICINE

## 2022-09-30 PROCEDURE — 84443 ASSAY THYROID STIM HORMONE: CPT | Performed by: FAMILY MEDICINE

## 2022-09-30 PROCEDURE — 99396 PREV VISIT EST AGE 40-64: CPT | Mod: S$GLB,,, | Performed by: FAMILY MEDICINE

## 2022-09-30 PROCEDURE — 3044F HG A1C LEVEL LT 7.0%: CPT | Mod: CPTII,S$GLB,, | Performed by: FAMILY MEDICINE

## 2022-09-30 PROCEDURE — 99999 PR PBB SHADOW E&M-EST. PATIENT-LVL III: ICD-10-PCS | Mod: PBBFAC,,, | Performed by: FAMILY MEDICINE

## 2022-09-30 PROCEDURE — 85027 COMPLETE CBC AUTOMATED: CPT | Performed by: FAMILY MEDICINE

## 2022-09-30 NOTE — PROGRESS NOTES
[facility] ENCOUNTER  9/30/22      REASON FOR VISIT  [facility]    PCP (Primary Care Provider)  Joanna says she currently doesn't have a PCP. She has appointment 11/23/22 to establish care with Emely Monique MD. I agreed to act as her PCP until then.    DIAGNOSES  The primary encounter diagnosis was Preventative health care. Diagnoses of Stable low-risk nodule of left lung, < 4 mm, Need for influenza vaccination, Need for COVID-19 vaccine, Need for diphtheria-tetanus-pertussis (Tdap) vaccine, Thyroid nodule (right), and Sinus arrhythmia were also pertinent to this visit.    PHYSICAL EXAM  Vitals:    09/30/22 0834   BP: 126/73   Pulse: 73   Temp: 98.5 °F (36.9 °C)   TempSrc: Oral   SpO2: 99%   Weight: 74 kg (163 lb 2.3 oz)   CONSTITUTIONAL: No apparent distress. Normal appearance.   EYES: No scleral icterus. Conjunctivae unremarkable.  ENT: Right tympanic membrane, ear canal and external ear normal. Left tympanic membrane, ear canal and external ear normal.   NECK: Subtle right thyroid nodule. No thyroid tenderness. No carotid bruit.   CARDIOVASCULAR: Normal rate and regular rhythm. Normal heart sounds.   PULMONARY: Pulmonary effort is normal. No respiratory distress. Normal breath sounds. No wheezing or rhonchi.   ABDOMINAL: Bowel sounds are normal. There is no distension. Abdomen is soft. There is no mass. There is no abdominal tenderness.   SKIN: Skin is warm and dry. Skin is not jaundiced.   NEUROLOGICAL: No focal deficit apparent. Alert, oriented to person, place, and time.   PSYCHIATRIC: Mood normal. Behavior normal. Judgment normal.     MEDICATIONS  Joanna currently has no medications in their medication list.    HEALTH MAINTENANCE AND SCREENINGS - UP TO DATE  Health Maintenance Topics with due status: Not Due       Topic Last Completion Date    Cervical Cancer Screening 12/08/2021    Mammogram 12/08/2021     HEALTH MAINTENANCE AND SCREENINGS - DUE OR DUE SOON  Health Maintenance Due   Topic Date  "Due    COVID-19 Vaccine (1) Never done    TETANUS VACCINE  Never done    Influenza Vaccine (1) Never done     FOLLOW-UP  Joanna is to follow up with me or Dr. Monique for any health problems or concerns, any abnormal test results, and any age-appropriate health maintenance interventions and screenings that may be due.    PAST MEDICAL HISTORY  Joanna has a past medical history of History of COVID-19.    SURGICAL HISTORY  Joanna has a past surgical history that includes removal of blood clot .    FAMILY HISTORY  Joanna family history includes Diabetes in her father; Heart disease in her maternal grandfather and paternal grandfather; Skin cancer in her father and mother.     ALLERGIES  Joanna reports she has No Known Allergies.    SOCIAL HISTORY  Joanna  reports that she has never smoked. She has never used smokeless tobacco. She reports current alcohol use. She reports that she does not use drugs.     Documentation entered by me for this encounter may have been done in part using speech-recognition technology. Although I have made an effort to ensure accuracy, "sound like" errors may exist and should be interpreted in context.  "

## 2022-09-30 NOTE — ASSESSMENT & PLAN NOTE
Incidental physical exam finding of subtle right thyroid nodule. No tenderness. She agreed to call appointment desk to schedule thyroid ultrasound before her appointment with Dr. Monique on 11/23/22.

## 2022-09-30 NOTE — LETTER
Dear Dominga,    I enjoyed meeting you at your recent [facility] exam.    This letter and the accompanying reports will summarize your medical history, physical exam findings, and test results.    Please send me a [facility] message if I can answer any questions.    Thanks for letting me care for you and trusting [facility] with your healthcare needs.    All the best,     EDU Orr MD     ENCLOSURES    P.S. I'm glad to see that you have an ENT appointment scheduled  at 9:45 AM with Shaka Pino MD for evaluation of your thyroid nodule. You will like Dr. Pino. He will take good care of you.      Test Results Summary for Dominga Contreras,  1980       CBC: The complete blood count (CBC) checks for anemia and measures your blood's red blood cells, white blood cells, and platelets.  o YOUR RESULTS: Normal     CMP: The comprehensive metabolic panel (CMP) checks kidney function, liver function, sodium, potassium, glucose (sugar), and other aspects of your metabolism.  o YOUR RESULTS: Normal or at least acceptable. No further evaluation or treatment is needed at this point.     Lipid Panel: The lipid panel measures the levels of different fatty substances in your blood (cholesterol and triglycerides) that can contribute to plaque buildup in your arteries (atherosclerosis).  o YOUR RESULTS: Normal or at least acceptable. No further evaluation or treatment is needed at this point.     TSH: The thyroid is a gland in the neck that helps regulate metabolism. The thyroid stimulating hormone (TSH) is a measure of your thyroid activity.  o YOUR RESULTS: Normal     A1c: The hemoglobin A1c (A1c) is a test that evaluates and screens for diabetes.  o YOUR RESULTS: Normal

## 2022-09-30 NOTE — PATIENT INSTRUCTIONS
The COVID-19 vaccinations are helping prevent hospitalization and death from COVID-19. The great majority of people hospitalized with COVID-19 are either unvaccinated or not fully vaccinated. I strongly recommend you get your COVID-19.    Please learn more about the COVID-19 vaccine at [email or web address]. Afterward, please let me know if I can answer any questions you may have about the vaccine. I'll be glad to help.     The quickest and easiest way to schedule an appointment for your COVID-19 vaccination is by using [facility] or by calling [phone].    Please take care of yourself. You are important to me.

## 2022-10-03 NOTE — PROGRESS NOTES
"Results OK.  ----------  If you want to learn more about your test results and what they mean, it's as simple as 1, 2, 3.  1. Log in to [facility] using the [facility] delvin or online at [email or web address].   2. From the "Test Results" tab, click on the test you want to know more about.  3. If using the mobile delvin, click the "Get Info" icon. (The "Get Info" icon looks like a Rincon with a lower case letter i inside it.) If using your computer, click the "Get Info" icon or the "About This Test" link."

## 2022-10-19 NOTE — PROGRESS NOTES
"Results look good!  ----------  If you want to learn more about your test results and what they mean, it's as simple as 1, 2, 3.  1. Log in to [facility] using the [facility] delvin or online at [email or web address].   2. From the "Test Results" tab, click on the test you want to know more about.  3. If using the mobile delvin, click the "Get Info" icon. (The "Get Info" icon looks like a Crow with a lower case letter i inside it.) If using your computer, click the "Get Info" icon or the "About This Test" link."

## 2022-10-26 ENCOUNTER — PATIENT MESSAGE (OUTPATIENT)
Dept: PRIMARY CARE CLINIC | Facility: CLINIC | Age: 42
End: 2022-10-26
Payer: COMMERCIAL

## 2022-10-26 ENCOUNTER — HOSPITAL ENCOUNTER (OUTPATIENT)
Dept: RADIOLOGY | Facility: HOSPITAL | Age: 42
Discharge: HOME | End: 2022-10-26
Attending: FAMILY MEDICINE
Payer: COMMERCIAL

## 2022-10-26 DIAGNOSIS — E04.1 THYROID NODULE: ICD-10-CM

## 2022-10-26 PROCEDURE — 76536 US EXAM OF HEAD AND NECK: CPT | Mod: TC

## 2022-10-26 PROCEDURE — 76536 US THYROID: ICD-10-PCS | Mod: 26,,, | Performed by: RADIOLOGY

## 2022-10-26 PROCEDURE — 76536 US EXAM OF HEAD AND NECK: CPT | Mod: 26,,, | Performed by: RADIOLOGY

## 2022-10-30 ENCOUNTER — PATIENT MESSAGE (OUTPATIENT)
Dept: INTERNAL MEDICINE | Facility: CLINIC | Age: 42
End: 2022-10-30
Payer: COMMERCIAL

## 2022-10-30 DIAGNOSIS — E04.1 THYROID NODULE: Primary | ICD-10-CM

## 2022-10-30 NOTE — PROGRESS NOTES
Your thyroid ultrasound showed that you have a thyroid nodule.    Thyroid nodules are round or oval-shaped areas within the thyroid that can be caused by a number of conditions, most of which are not serious.    Thyroid nodules are very common; up to half of adults have at least one thyroid nodule, although most do not know about it.    Dominga Holm.    Your thyroid nodule needs additional evaluation, so I have entered a referral order for you to be evaluated by one of our excellent ENTs (a specialist that diagnoses and treats diseases of the ear, nose, and throat).    Someone from [facility] should be contacting you soon to help schedule your appointment. If you haven't been contacted within the next 3-4 business days, call [facility] [employer] appointment desk ([phone]).    Thanks for letting me care for you, and thanks for trusting [facility] with your healthcare needs.    All the best,    EDU Orr MD

## 2022-10-31 ENCOUNTER — PATIENT MESSAGE (OUTPATIENT)
Dept: INTERNAL MEDICINE | Facility: CLINIC | Age: 42
End: 2022-10-31
Payer: COMMERCIAL

## 2022-10-31 DIAGNOSIS — E04.1 THYROID NODULE: Primary | ICD-10-CM

## 2022-11-01 ENCOUNTER — TELEPHONE (OUTPATIENT)
Dept: INTERNAL MEDICINE | Facility: CLINIC | Age: 42
End: 2022-11-01
Payer: COMMERCIAL

## 2022-11-01 NOTE — TELEPHONE ENCOUNTER
----- Message from Sara Camilo sent at 11/1/2022 11:33 AM CDT -----  Contact: pt  Type:  Patient Returning Call    Who Called: pt  Who Left Message for Patient: unknown   Does the patient know what this is regarding? no  Would the patient rather a call back or a response via [facility]? Call back  Best Call Back Number: [phone]  Additional Information: n/a

## 2022-11-01 NOTE — TELEPHONE ENCOUNTER
----- Message from Sara Camilo sent at 11/1/2022 11:33 AM CDT -----  Contact: pt  Type:  Patient Returning Call    Who Called: pt  Who Left Message for Patient: unknown   Does the patient know what this is regarding? no  Would the patient rather a call back or a response via [facility]? Call back  Best Call Back Number: [phone]  Additional Information: n/a

## 2022-11-01 NOTE — TELEPHONE ENCOUNTER
Spoke with pt regarding test results patient states she read message and is doing some of her own research on the matter .//LB

## 2022-11-02 ENCOUNTER — TELEPHONE (OUTPATIENT)
Dept: INTERNAL MEDICINE | Facility: CLINIC | Age: 42
End: 2022-11-02
Payer: COMMERCIAL

## 2022-11-02 DIAGNOSIS — E04.1 THYROID NODULE: Primary | ICD-10-CM

## 2022-11-02 NOTE — TELEPHONE ENCOUNTER
----- Message from Nayeli Aleman sent at 11/2/2022  2:54 PM CDT -----  Contact: Dominga Orr would like a call back at [phone], in regards to getting her referral for ENT sent over to Dr.Jean Mobley, at the  Clinic. Phone number is [phone].  Thanks

## 2022-11-05 ENCOUNTER — PATIENT MESSAGE (OUTPATIENT)
Dept: INTERNAL MEDICINE | Facility: CLINIC | Age: 42
End: 2022-11-05
Payer: COMMERCIAL

## 2022-11-05 NOTE — TELEPHONE ENCOUNTER
"Hi, Dominga.    I received a message from you that was relayed to me as, "Dominga would like her referral for ENT sent over to Dr.Jean Mobley, at the [facility]."    Having read your subsequent message, I now believe you wanted a  referral to endocrinologist Jerrica Mobley MD, which I have ordered for you. You should be able to call Dr. Mobley's office to schedule your appointment.    Orders Placed This Encounter   Procedures    Ambulatory referral/consult to Endocrinology     Referral Type:   Consultation     Referred to Provider:   Jerrica Mobley MD     Requested Specialty:   Endocrinology      I read your questions:  Can I get orders to have blood work done to test the thyroid hormones? I see that was not done this year. I would like to know if they're stable.  Is there a reason the ENT is being recommended over an endocrinologist? I am just curious.   Is the 3cm size something of major concern?     Those are all good questions that deserve a good discussion, and I want you to understand your treatment options. Please click on the accompanying link to schedule a virtual video visit appointment with me at your earliest convenience so we can give this the time and attention it deserves.    Thanks for letting me care for you, and thanks for trusting [facility] with your healthcare needs.    All the best,    EDU Orr MD   "

## 2022-11-05 NOTE — TELEPHONE ENCOUNTER
"ENT Dr. Maksim Mobley not found in database of external providers.    TO MY TEAM:   Print referral order for "Ambulatory referral/consult to ENT" dated 11/05/2022 (Referral # [id number]).  Fax to ENT Dr. Maksim Mobley and notify patient.    Orders Placed This Encounter   Procedures    Ambulatory referral/consult to ENT      "

## 2022-11-07 NOTE — TELEPHONE ENCOUNTER
Pt  notified that Endo referral was faxed Dr Maksim Mobley's office.  Stated she had already tried making appt with Dr. Mobley but wasn't able to get in until June 2023, so she made appt with another provider in March.  Pt. wants to know if it is ok to wait until March to be seen.

## 2022-11-09 ENCOUNTER — LAB VISIT (OUTPATIENT)
Dept: LAB | Facility: HOSPITAL | Age: 42
End: 2022-11-09
Attending: NURSE PRACTITIONER
Payer: COMMERCIAL

## 2022-11-09 ENCOUNTER — OFFICE VISIT (OUTPATIENT)
Dept: PRIMARY CARE CLINIC | Facility: CLINIC | Age: 42
End: 2022-11-09
Payer: COMMERCIAL

## 2022-11-09 VITALS
WEIGHT: 161.5 LBS | SYSTOLIC BLOOD PRESSURE: 128 MMHG | HEART RATE: 98 BPM | TEMPERATURE: 98 F | DIASTOLIC BLOOD PRESSURE: 79 MMHG | BODY MASS INDEX: 27.57 KG/M2 | HEIGHT: 64 IN

## 2022-11-09 DIAGNOSIS — R53.83 FATIGUE, UNSPECIFIED TYPE: ICD-10-CM

## 2022-11-09 DIAGNOSIS — B00.1 FEVER BLISTER: ICD-10-CM

## 2022-11-09 DIAGNOSIS — E04.1 THYROID NODULE: ICD-10-CM

## 2022-11-09 DIAGNOSIS — E04.1 THYROID NODULE: Primary | ICD-10-CM

## 2022-11-09 LAB — THYROPEROXIDASE IGG SERPL-ACNC: <6 IU/ML

## 2022-11-09 PROCEDURE — 84480 ASSAY TRIIODOTHYRONINE (T3): CPT | Performed by: NURSE PRACTITIONER

## 2022-11-09 PROCEDURE — 3074F PR MOST RECENT SYSTOLIC BLOOD PRESSURE < 130 MM HG: ICD-10-PCS | Mod: CPTII,S$GLB,, | Performed by: NURSE PRACTITIONER

## 2022-11-09 PROCEDURE — 1160F PR REVIEW ALL MEDS BY PRESCRIBER/CLIN PHARMACIST DOCUMENTED: ICD-10-PCS | Mod: CPTII,S$GLB,, | Performed by: NURSE PRACTITIONER

## 2022-11-09 PROCEDURE — 36415 COLL VENOUS BLD VENIPUNCTURE: CPT | Performed by: NURSE PRACTITIONER

## 2022-11-09 PROCEDURE — 1160F RVW MEDS BY RX/DR IN RCRD: CPT | Mod: CPTII,S$GLB,, | Performed by: NURSE PRACTITIONER

## 2022-11-09 PROCEDURE — 86376 MICROSOMAL ANTIBODY EACH: CPT | Performed by: NURSE PRACTITIONER

## 2022-11-09 PROCEDURE — 83520 IMMUNOASSAY QUANT NOS NONAB: CPT | Performed by: NURSE PRACTITIONER

## 2022-11-09 PROCEDURE — 1159F MED LIST DOCD IN RCRD: CPT | Mod: CPTII,S$GLB,, | Performed by: NURSE PRACTITIONER

## 2022-11-09 PROCEDURE — 3044F HG A1C LEVEL LT 7.0%: CPT | Mod: CPTII,S$GLB,, | Performed by: NURSE PRACTITIONER

## 2022-11-09 PROCEDURE — 99214 PR OFFICE/OUTPT VISIT, EST, LEVL IV, 30-39 MIN: ICD-10-PCS | Mod: S$GLB,,, | Performed by: NURSE PRACTITIONER

## 2022-11-09 PROCEDURE — 3044F PR MOST RECENT HEMOGLOBIN A1C LEVEL <7.0%: ICD-10-PCS | Mod: CPTII,S$GLB,, | Performed by: NURSE PRACTITIONER

## 2022-11-09 PROCEDURE — 99999 PR PBB SHADOW E&M-EST. PATIENT-LVL III: CPT | Mod: PBBFAC,,, | Performed by: NURSE PRACTITIONER

## 2022-11-09 PROCEDURE — 99999 PR PBB SHADOW E&M-EST. PATIENT-LVL III: ICD-10-PCS | Mod: PBBFAC,,, | Performed by: NURSE PRACTITIONER

## 2022-11-09 PROCEDURE — 1159F PR MEDICATION LIST DOCUMENTED IN MEDICAL RECORD: ICD-10-PCS | Mod: CPTII,S$GLB,, | Performed by: NURSE PRACTITIONER

## 2022-11-09 PROCEDURE — 82306 VITAMIN D 25 HYDROXY: CPT | Performed by: NURSE PRACTITIONER

## 2022-11-09 PROCEDURE — 3078F PR MOST RECENT DIASTOLIC BLOOD PRESSURE < 80 MM HG: ICD-10-PCS | Mod: CPTII,S$GLB,, | Performed by: NURSE PRACTITIONER

## 2022-11-09 PROCEDURE — 99214 OFFICE O/P EST MOD 30 MIN: CPT | Mod: S$GLB,,, | Performed by: NURSE PRACTITIONER

## 2022-11-09 PROCEDURE — 3008F BODY MASS INDEX DOCD: CPT | Mod: CPTII,S$GLB,, | Performed by: NURSE PRACTITIONER

## 2022-11-09 PROCEDURE — 82607 VITAMIN B-12: CPT | Performed by: NURSE PRACTITIONER

## 2022-11-09 PROCEDURE — 84439 ASSAY OF FREE THYROXINE: CPT | Performed by: NURSE PRACTITIONER

## 2022-11-09 PROCEDURE — 3078F DIAST BP <80 MM HG: CPT | Mod: CPTII,S$GLB,, | Performed by: NURSE PRACTITIONER

## 2022-11-09 PROCEDURE — 3008F PR BODY MASS INDEX (BMI) DOCUMENTED: ICD-10-PCS | Mod: CPTII,S$GLB,, | Performed by: NURSE PRACTITIONER

## 2022-11-09 PROCEDURE — 3074F SYST BP LT 130 MM HG: CPT | Mod: CPTII,S$GLB,, | Performed by: NURSE PRACTITIONER

## 2022-11-09 RX ORDER — VALACYCLOVIR HYDROCHLORIDE 1 G/1
2000 TABLET, FILM COATED ORAL 2 TIMES DAILY
Qty: 12 TABLET | Refills: 3 | Status: SHIPPED | OUTPATIENT
Start: 2022-11-09 | End: 2024-01-12

## 2022-11-10 LAB
25(OH)D3+25(OH)D2 SERPL-MCNC: 38 NG/ML (ref 30–96)
T3 SERPL-MCNC: 103 NG/DL (ref 60–180)
T4 FREE SERPL-MCNC: 1.06 NG/DL (ref 0.71–1.51)
VIT B12 SERPL-MCNC: 530 PG/ML (ref 210–950)

## 2022-11-11 LAB — TSH RECEP AB SER-ACNC: <1.1 IU/L (ref 0–1.75)

## 2022-11-14 ENCOUNTER — PATIENT MESSAGE (OUTPATIENT)
Dept: PRIMARY CARE CLINIC | Facility: CLINIC | Age: 42
End: 2022-11-14
Payer: COMMERCIAL

## 2022-11-22 ENCOUNTER — OFFICE VISIT (OUTPATIENT)
Dept: FAMILY MEDICINE | Facility: CLINIC | Age: 42
End: 2022-11-22
Payer: COMMERCIAL

## 2022-11-22 VITALS
HEIGHT: 64 IN | WEIGHT: 167.13 LBS | OXYGEN SATURATION: 97 % | SYSTOLIC BLOOD PRESSURE: 120 MMHG | TEMPERATURE: 98 F | BODY MASS INDEX: 28.53 KG/M2 | DIASTOLIC BLOOD PRESSURE: 88 MMHG | HEART RATE: 106 BPM

## 2022-11-22 DIAGNOSIS — R68.89 FLU-LIKE SYMPTOMS: Primary | ICD-10-CM

## 2022-11-22 DIAGNOSIS — J06.9 UPPER RESPIRATORY TRACT INFECTION, UNSPECIFIED TYPE: ICD-10-CM

## 2022-11-22 DIAGNOSIS — Z11.52 ENCOUNTER FOR SCREENING FOR COVID-19: ICD-10-CM

## 2022-11-22 LAB
CTP QC/QA: YES
CTP QC/QA: YES
FLUAV AG NPH QL: NEGATIVE
FLUBV AG NPH QL: NEGATIVE
SARS-COV-2 RDRP RESP QL NAA+PROBE: NEGATIVE

## 2022-11-22 PROCEDURE — 1159F PR MEDICATION LIST DOCUMENTED IN MEDICAL RECORD: ICD-10-PCS | Mod: CPTII,S$GLB,, | Performed by: FAMILY MEDICINE

## 2022-11-22 PROCEDURE — 87804 POCT INFLUENZA A/B: ICD-10-PCS | Mod: QW,S$GLB,, | Performed by: FAMILY MEDICINE

## 2022-11-22 PROCEDURE — 99999 PR PBB SHADOW E&M-EST. PATIENT-LVL III: ICD-10-PCS | Mod: PBBFAC,,, | Performed by: FAMILY MEDICINE

## 2022-11-22 PROCEDURE — 1159F MED LIST DOCD IN RCRD: CPT | Mod: CPTII,S$GLB,, | Performed by: FAMILY MEDICINE

## 2022-11-22 PROCEDURE — 3074F PR MOST RECENT SYSTOLIC BLOOD PRESSURE < 130 MM HG: ICD-10-PCS | Mod: CPTII,S$GLB,, | Performed by: FAMILY MEDICINE

## 2022-11-22 PROCEDURE — 3079F PR MOST RECENT DIASTOLIC BLOOD PRESSURE 80-89 MM HG: ICD-10-PCS | Mod: CPTII,S$GLB,, | Performed by: FAMILY MEDICINE

## 2022-11-22 PROCEDURE — 3044F HG A1C LEVEL LT 7.0%: CPT | Mod: CPTII,S$GLB,, | Performed by: FAMILY MEDICINE

## 2022-11-22 PROCEDURE — 3074F SYST BP LT 130 MM HG: CPT | Mod: CPTII,S$GLB,, | Performed by: FAMILY MEDICINE

## 2022-11-22 PROCEDURE — 87804 INFLUENZA ASSAY W/OPTIC: CPT | Mod: QW,S$GLB,, | Performed by: FAMILY MEDICINE

## 2022-11-22 PROCEDURE — 87635: ICD-10-PCS | Mod: QW,S$GLB,, | Performed by: FAMILY MEDICINE

## 2022-11-22 PROCEDURE — 3079F DIAST BP 80-89 MM HG: CPT | Mod: CPTII,S$GLB,, | Performed by: FAMILY MEDICINE

## 2022-11-22 PROCEDURE — 99214 PR OFFICE/OUTPT VISIT, EST, LEVL IV, 30-39 MIN: ICD-10-PCS | Mod: S$GLB,,, | Performed by: FAMILY MEDICINE

## 2022-11-22 PROCEDURE — 3044F PR MOST RECENT HEMOGLOBIN A1C LEVEL <7.0%: ICD-10-PCS | Mod: CPTII,S$GLB,, | Performed by: FAMILY MEDICINE

## 2022-11-22 PROCEDURE — 99214 OFFICE O/P EST MOD 30 MIN: CPT | Mod: S$GLB,,, | Performed by: FAMILY MEDICINE

## 2022-11-22 PROCEDURE — 3008F PR BODY MASS INDEX (BMI) DOCUMENTED: ICD-10-PCS | Mod: CPTII,S$GLB,, | Performed by: FAMILY MEDICINE

## 2022-11-22 PROCEDURE — 3008F BODY MASS INDEX DOCD: CPT | Mod: CPTII,S$GLB,, | Performed by: FAMILY MEDICINE

## 2022-11-22 PROCEDURE — 87635 SARS-COV-2 COVID-19 AMP PRB: CPT | Mod: QW,S$GLB,, | Performed by: FAMILY MEDICINE

## 2022-11-22 PROCEDURE — 99999 PR PBB SHADOW E&M-EST. PATIENT-LVL III: CPT | Mod: PBBFAC,,, | Performed by: FAMILY MEDICINE

## 2022-11-22 RX ORDER — METHYLPREDNISOLONE 4 MG/1
TABLET ORAL
Qty: 21 EACH | Refills: 0 | Status: SHIPPED | OUTPATIENT
Start: 2022-11-22 | End: 2022-12-07

## 2022-11-22 RX ORDER — PROMETHAZINE HYDROCHLORIDE AND DEXTROMETHORPHAN HYDROBROMIDE 6.25; 15 MG/5ML; MG/5ML
5 SYRUP ORAL EVERY 8 HOURS PRN
Qty: 180 ML | Refills: 0 | Status: SHIPPED | OUTPATIENT
Start: 2022-11-22 | End: 2022-12-02

## 2022-11-22 NOTE — PROGRESS NOTES
Subjective:      Patient ID: Dominga Contreras is a 41 y.o. female.    Chief Complaint: Cough    HPI    Patient here today cough. Started 4-5 days ago. Went to dance competition in [place] and it started there. Voice issues due to coughing and drainage, ear pressure. Mucous with coughing. Sore throat. Fatigue. No body aches, night sweats,no chest pain or shortness. Everyone in her house has similar symptoms.     Past Medical History:   Diagnosis Date    History of COVID-19 08/25/2021    Nontoxic single thyroid nodule        Past Surgical History:   Procedure Laterality Date    removal of blood clot       lower back       Family History   Problem Relation Age of Onset    Skin cancer Mother     Diabetes Father     Skin cancer Father     Heart disease Maternal Grandfather     Heart disease Paternal Grandfather        Social History     Socioeconomic History    Marital status:     Number of children: 3   Occupational History    Occupation:  z[employer]    Tobacco Use    Smoking status: Never    Smokeless tobacco: Never   Substance and Sexual Activity    Alcohol use: Yes     Comment: occasionally     Drug use: No    Sexual activity: Yes     Birth control/protection: Partner-Vasectomy   Social History Narrative    . Advertising.        Health Maintenance Topics with due status: Not Due       Topic Last Completion Date    Cervical Cancer Screening 12/08/2021       Medication List with Changes/Refills   New Medications    METHYLPREDNISOLONE (MEDROL DOSEPACK) 4 MG TABLET    use as directed    PROMETHAZINE-DEXTROMETHORPHAN (PROMETHAZINE-DM) 6.25-15 MG/5 ML SYRP    Take 5 mLs by mouth every 8 (eight) hours as needed (cough).   Current Medications    VALACYCLOVIR (VALTREX) 1000 MG TABLET    Take 2 tablets (2,000 mg total) by mouth 2 (two) times daily. for 1 day       Review of patient's allergies indicates:  No Known Allergies    Review of Systems   Constitutional:  Negative for fever.   HENT:  Positive  for congestion.    Eyes:  Negative for blurred vision.   Respiratory:  Positive for cough. Negative for shortness of breath.    Cardiovascular:  Negative for chest pain and leg swelling.   Gastrointestinal:  Negative for abdominal pain, constipation and diarrhea.   Genitourinary:  Negative for dysuria.   Skin:  Negative for rash.   Neurological:  Negative for headaches.     Objective:     Vitals:    11/22/22 0932   BP: 120/88   Pulse: 106   Temp: 98 °F (36.7 °C)     Body mass index is 28.68 kg/m².    Physical Exam  Vitals and nursing note reviewed.   Constitutional:       General: She is not in acute distress.     Appearance: She is well-developed.   HENT:      Head: Normocephalic and atraumatic.      Right Ear: External ear normal.      Left Ear: External ear normal.      Nose: Nose normal.   Eyes:      Conjunctiva/sclera: Conjunctivae normal.      Pupils: Pupils are equal, round, and reactive to light.   Neck:      Thyroid: No thyromegaly.   Cardiovascular:      Rate and Rhythm: Normal rate and regular rhythm.      Heart sounds: Normal heart sounds. No murmur heard.  Pulmonary:      Effort: Pulmonary effort is normal. No respiratory distress.      Breath sounds: Normal breath sounds. No wheezing or rales.   Chest:      Chest wall: No tenderness.   Lymphadenopathy:      Cervical: No cervical adenopathy.   Skin:     General: Skin is warm and dry.   Neurological:      Mental Status: She is alert and oriented to person, place, and time.     Assessment and Plan:     Flu-like symptoms  -     POCT Influenza A/B    Encounter for screening for COVID-19  -     POCT COVID-19 Rapid Screening    Upper respiratory tract infection, unspecified type  -     methylPREDNISolone (MEDROL DOSEPACK) 4 mg tablet; use as directed  Dispense: 21 each; Refill: 0  -     promethazine-dextromethorphan (PROMETHAZINE-DM) 6.25-15 mg/5 mL Syrp; Take 5 mLs by mouth every 8 (eight) hours as needed (cough).  Dispense: 180 mL; Refill: 0    Viral in  origin  Symptomatic treatment  Rest, hydrate     No follow-ups on file.

## 2022-12-01 ENCOUNTER — PATIENT MESSAGE (OUTPATIENT)
Dept: PRIMARY CARE CLINIC | Facility: CLINIC | Age: 42
End: 2022-12-01
Payer: COMMERCIAL

## 2022-12-07 ENCOUNTER — TELEPHONE (OUTPATIENT)
Dept: PRIMARY CARE CLINIC | Facility: CLINIC | Age: 42
End: 2022-12-07

## 2022-12-07 ENCOUNTER — OFFICE VISIT (OUTPATIENT)
Dept: PRIMARY CARE CLINIC | Facility: CLINIC | Age: 42
End: 2022-12-07
Payer: COMMERCIAL

## 2022-12-07 VITALS
HEART RATE: 77 BPM | SYSTOLIC BLOOD PRESSURE: 126 MMHG | DIASTOLIC BLOOD PRESSURE: 88 MMHG | WEIGHT: 165.56 LBS | BODY MASS INDEX: 28.27 KG/M2 | HEIGHT: 64 IN | TEMPERATURE: 99 F

## 2022-12-07 DIAGNOSIS — B00.1 FEVER BLISTER: ICD-10-CM

## 2022-12-07 DIAGNOSIS — E04.1 THYROID NODULE: Primary | ICD-10-CM

## 2022-12-07 PROCEDURE — 99999 PR PBB SHADOW E&M-EST. PATIENT-LVL III: CPT | Mod: PBBFAC,,, | Performed by: FAMILY MEDICINE

## 2022-12-07 PROCEDURE — 1159F MED LIST DOCD IN RCRD: CPT | Mod: CPTII,S$GLB,, | Performed by: FAMILY MEDICINE

## 2022-12-07 PROCEDURE — 99214 PR OFFICE/OUTPT VISIT, EST, LEVL IV, 30-39 MIN: ICD-10-PCS | Mod: S$GLB,,, | Performed by: FAMILY MEDICINE

## 2022-12-07 PROCEDURE — 1159F PR MEDICATION LIST DOCUMENTED IN MEDICAL RECORD: ICD-10-PCS | Mod: CPTII,S$GLB,, | Performed by: FAMILY MEDICINE

## 2022-12-07 PROCEDURE — 3044F HG A1C LEVEL LT 7.0%: CPT | Mod: CPTII,S$GLB,, | Performed by: FAMILY MEDICINE

## 2022-12-07 PROCEDURE — 99999 PR PBB SHADOW E&M-EST. PATIENT-LVL III: ICD-10-PCS | Mod: PBBFAC,,, | Performed by: FAMILY MEDICINE

## 2022-12-07 PROCEDURE — 3008F BODY MASS INDEX DOCD: CPT | Mod: CPTII,S$GLB,, | Performed by: FAMILY MEDICINE

## 2022-12-07 PROCEDURE — 3079F DIAST BP 80-89 MM HG: CPT | Mod: CPTII,S$GLB,, | Performed by: FAMILY MEDICINE

## 2022-12-07 PROCEDURE — 1160F RVW MEDS BY RX/DR IN RCRD: CPT | Mod: CPTII,S$GLB,, | Performed by: FAMILY MEDICINE

## 2022-12-07 PROCEDURE — 3008F PR BODY MASS INDEX (BMI) DOCUMENTED: ICD-10-PCS | Mod: CPTII,S$GLB,, | Performed by: FAMILY MEDICINE

## 2022-12-07 PROCEDURE — 3074F SYST BP LT 130 MM HG: CPT | Mod: CPTII,S$GLB,, | Performed by: FAMILY MEDICINE

## 2022-12-07 PROCEDURE — 3079F PR MOST RECENT DIASTOLIC BLOOD PRESSURE 80-89 MM HG: ICD-10-PCS | Mod: CPTII,S$GLB,, | Performed by: FAMILY MEDICINE

## 2022-12-07 PROCEDURE — 3044F PR MOST RECENT HEMOGLOBIN A1C LEVEL <7.0%: ICD-10-PCS | Mod: CPTII,S$GLB,, | Performed by: FAMILY MEDICINE

## 2022-12-07 PROCEDURE — 99214 OFFICE O/P EST MOD 30 MIN: CPT | Mod: S$GLB,,, | Performed by: FAMILY MEDICINE

## 2022-12-07 PROCEDURE — 3074F PR MOST RECENT SYSTOLIC BLOOD PRESSURE < 130 MM HG: ICD-10-PCS | Mod: CPTII,S$GLB,, | Performed by: FAMILY MEDICINE

## 2022-12-07 PROCEDURE — 1160F PR REVIEW ALL MEDS BY PRESCRIBER/CLIN PHARMACIST DOCUMENTED: ICD-10-PCS | Mod: CPTII,S$GLB,, | Performed by: FAMILY MEDICINE

## 2022-12-07 NOTE — PROGRESS NOTES
Subjective:      Patient ID: Dominga Contreras is a 42 y.o. female.    Chief Complaint: [facility]      Patient is a 42 y.o. female coming in today  has a past medical history of  Nontoxic single thyroid nodule. Here to [facility].  with [facility] [employer] each year. Did have thyroid nodule noted on PE, and waiting to get in with Dr. Pino in [place] to discuss next steps. No pain. No issues. Occasionally with fever blisters. Valtrex helps. Originally from [place]. 3 kids, lives in [place]. Friends with Keke Girard.       No questionnaires on file.  Past Medical History:   Diagnosis Date    History of COVID-19 2021    Nontoxic single thyroid nodule      Past Surgical History:   Procedure Laterality Date    removal of blood clot       lower back     Family History   Problem Relation Age of Onset    Skin cancer Mother     Cancer Mother         Skin    Diabetes Father     Skin cancer Father     Cancer Father         Skin    Heart disease Maternal Grandfather          of a heart attack in his 70's    Heart disease Paternal Grandfather          of a heart attack in his 50's    Cancer Maternal Grandmother         Lung    Arthritis Paternal Grandmother      Social History     Tobacco Use    Smoking status: Never    Smokeless tobacco: Never   Substance and Sexual Activity    Alcohol use: Yes     Alcohol/week: 4.0 standard drinks     Types: 4 Glasses of wine per week     Comment: occasionally     Drug use: No    Sexual activity: Yes     Partners: Male     Birth control/protection: Partner-Vasectomy       LABS:   Lab Results   Component Value Date    HGBA1C 4.9 2022    HGBA1C 5.1 2021    HGBA1C 5.1 2020      Lab Results   Component Value Date    CHOL 226 (H) 2022    CHOL 214 (H) 2021    CHOL 209 (H) 2020     Lab Results   Component Value Date    LDLCALC 137.8 2022    LDLCALC 126.6 2021    LDLCALC 126.4 2020     Lab Results   Component Value Date     WBC 6.43 09/30/2022    HGB 14.1 09/30/2022    HCT 42.3 09/30/2022     09/30/2022    CHOL 226 (H) 09/30/2022    TRIG 71 09/30/2022    HDL 74 09/30/2022    ALT 17 09/30/2022    AST 18 09/30/2022     09/30/2022    K 4.3 09/30/2022     09/30/2022    CREATININE 0.7 09/30/2022    BUN 9 09/30/2022    CO2 22 (L) 09/30/2022    TSH 1.571 09/30/2022    HGBA1C 4.9 09/30/2022       US Thyroid  Narrative: EXAMINATION:  US THYROID    CLINICAL HISTORY:  Nontoxic single thyroid nodule    TECHNIQUE:  Ultrasound of the thyroid and cervical lymph nodes was performed.    COMPARISON:  Chest CT from 2020..    FINDINGS:  Overall gland volume measures 16 cc.  No nodule is present in the isthmus or the left lobe of the gland.  There is a 3 cm solid nodule which isoechoic to minimally hypoechoic to background parenchyma located in the midportion of the gland extending inferiorly.  Small complex cystic and solid nodule measuring 7 mm is also present in the lower right lobe of the gland.  Impression: 3 cm solid nodule in the mid right lobe of the gland meets ACR TI rads criteria for fine-needle aspiration.    Electronically signed by: Jethro Victoria MD  Date:    10/26/2022  Time:    13:55        Review of Systems   Constitutional:  Negative for chills, fatigue and fever.   HENT:  Negative for ear pain and trouble swallowing.    Eyes:  Negative for pain and visual disturbance.   Respiratory:  Negative for cough and shortness of breath.    Cardiovascular:  Negative for chest pain and leg swelling.   Gastrointestinal:  Negative for abdominal pain, blood in stool, nausea and vomiting.   Endocrine: Negative for cold intolerance and heat intolerance.   Genitourinary:  Negative for dysuria and frequency.   Musculoskeletal:  Negative for joint swelling, myalgias and neck pain.   Skin:  Negative for color change and rash.   Neurological:  Negative for dizziness and headaches.   Psychiatric/Behavioral:  Negative for behavioral problems  "and sleep disturbance.    Objective:     Vitals:    12/07/22 1015   BP: 126/88   Pulse: 77   Temp: 98.5 °F (36.9 °C)   Weight: 75.1 kg (165 lb 9.1 oz)   Height: 5' 4" (1.626 m)     Wt Readings from Last 10 Encounters:   12/07/22 75.1 kg (165 lb 9.1 oz)   11/22/22 75.8 kg (167 lb 1.7 oz)   11/09/22 73.2 kg (161 lb 7.8 oz)   09/30/22 74 kg (163 lb 2.3 oz)   01/27/22 73.2 kg (161 lb 6 oz)   12/08/21 71.2 kg (157 lb)   10/25/21 70.8 kg (156 lb)   09/22/21 71.1 kg (156 lb 12 oz)   08/25/21 68 kg (149 lb 14.6 oz)   11/10/20 68 kg (150 lb)     Physical Exam  Vitals reviewed.   Constitutional:       Appearance: Normal appearance. She is well-developed and normal weight.   HENT:      Head: Normocephalic and atraumatic.      Right Ear: Tympanic membrane and external ear normal.      Left Ear: Tympanic membrane and external ear normal.      Nose: Nose normal.      Mouth/Throat:      Mouth: Mucous membranes are moist.      Pharynx: Oropharynx is clear.   Eyes:      Conjunctiva/sclera: Conjunctivae normal.      Pupils: Pupils are equal, round, and reactive to light.   Neck:      Thyroid: Thyroid mass and thyromegaly present. No thyroid tenderness.     Cardiovascular:      Rate and Rhythm: Normal rate and regular rhythm.      Heart sounds: No murmur heard.    No friction rub. No gallop.   Pulmonary:      Effort: Pulmonary effort is normal. No respiratory distress.      Breath sounds: No wheezing or rales.   Abdominal:      General: Bowel sounds are normal. There is no distension.      Palpations: Abdomen is soft.      Tenderness: There is no abdominal tenderness. There is no rebound.   Musculoskeletal:         General: Normal range of motion.      Cervical back: Normal range of motion and neck supple. No pain with movement. Normal range of motion.   Lymphadenopathy:      Cervical: No cervical adenopathy.   Skin:     General: Skin is warm and dry.      Findings: No rash.   Neurological:      Mental Status: She is alert and " oriented to person, place, and time.   Psychiatric:         Attention and Perception: Attention and perception normal.         Mood and Affect: Mood and affect normal.         Speech: Speech normal.         Behavior: Behavior normal.         Thought Content: Thought content normal.         Cognition and Memory: Cognition and memory normal.         Judgment: Judgment normal.     Assessment:     1. Thyroid nodule    2. Fever blister      Plan:   Dominga was seen today for establish care.    Diagnoses and all orders for this visit:    Thyroid nodule  Comments:  repeat thyroid u/s, seeing Dr. Pino jan 5th for further evaluation.   Orders:  -     US Soft Tissue Head Neck Thyroid; Future    Fever blister  Comments:  can add lysine, continue wiht prn valtrex.     Discussed size, u/s report and next steps of biopsy vs removal of partial thyroid gland vs watchful waiting. No symptoms of thyroid disorder at this time. Willing to wait till Jan 5th for ENT appt.     Health Maintenance Due   Topic Date Due    COVID-19 Vaccine (1) Never done    TETANUS VACCINE  Never done    Mammogram  12/08/2022     The 10-year ASCVD risk score (Silver DK, et al., 2019) is: 0.6%    Values used to calculate the score:      Age: 42 years      Sex: Female      Is Non- : No      Diabetic: No      Tobacco smoker: No      Systolic Blood Pressure: 126 mmHg      Is BP treated: No      HDL Cholesterol: 74 mg/dL      Total Cholesterol: 226 mg/dL  Follow up in about 1 year (around 12/7/2023) for physical with Dr TINSLEY.    There are no Patient Instructions on file for this visit.            30 minutes of total time spent on the encounter, which includes face to face time and non-face to face time preparing to see the patient (eg, review of tests), Obtaining and/or reviewing separately obtained history, Documenting clinical information in the electronic or other health record, Independently interpreting results (not separately  reported) and communicating results to the patient/family/caregiver, or Care coordination (not separately reported).             requires elixir form

## 2022-12-16 ENCOUNTER — HOSPITAL ENCOUNTER (OUTPATIENT)
Dept: RADIOLOGY | Facility: HOSPITAL | Age: 42
Discharge: HOME | End: 2022-12-16
Attending: FAMILY MEDICINE
Payer: COMMERCIAL

## 2022-12-16 DIAGNOSIS — E04.1 THYROID NODULE: ICD-10-CM

## 2022-12-16 PROCEDURE — 76536 US EXAM OF HEAD AND NECK: CPT | Mod: TC

## 2022-12-16 PROCEDURE — 76536 US SOFT TISSUE HEAD NECK THYROID: ICD-10-PCS | Mod: 26,,, | Performed by: RADIOLOGY

## 2022-12-16 PROCEDURE — 76536 US EXAM OF HEAD AND NECK: CPT | Mod: 26,,, | Performed by: RADIOLOGY

## 2022-12-21 ENCOUNTER — PATIENT MESSAGE (OUTPATIENT)
Dept: PRIMARY CARE CLINIC | Facility: CLINIC | Age: 42
End: 2022-12-21
Payer: COMMERCIAL

## 2022-12-21 NOTE — PROGRESS NOTES
No significant change from prior thyroid u/s but will need the biopsy, so if something changes with your [place] appt with Dr. Pino, then just let me know and I can just setup to have it done with interventional radiology instead. Emely Monique MD

## 2022-12-26 NOTE — PROGRESS NOTES
"Disclaimer:  This note is prepared using voice recognition software and as such is likely to have errors and has not been proof read. Please contact me for questions.    Subjective:      Patient ID: Dominga Contreras is a 42 y.o. female.    Chief Complaint: Follow-up    Needing thyroid labs. Also requests refill for valacyclovir to have on hand for fever blisters.    Follow-up  Pertinent negatives include no abdominal pain, chills, fever, nausea, neck pain, rash or vomiting.     Review of Systems   Constitutional:  Negative for chills and fever.   HENT: Negative.     Eyes: Negative.    Respiratory: Negative.  Negative for shortness of breath and wheezing.    Cardiovascular: Negative.    Gastrointestinal:  Negative for abdominal pain, constipation, diarrhea, nausea and vomiting.   Endocrine: Negative.    Genitourinary: Negative.    Musculoskeletal: Negative.  Negative for neck pain and neck stiffness.   Skin:  Negative for rash.   Allergic/Immunologic: Negative.    Neurological: Negative.    Hematological: Negative.    Psychiatric/Behavioral: Negative.     All other systems reviewed and are negative.    Objective:   /79   Pulse 98   Temp 97.6 °F (36.4 °C)   Ht 5' 4" (1.626 m)   Wt 73.2 kg (161 lb 7.8 oz)   LMP 10/26/2022 (Approximate)   BMI 27.72 kg/m²   Physical Exam  Constitutional:       General: She is not in acute distress.     Appearance: Normal appearance. She is well-developed.   HENT:      Head: Normocephalic and atraumatic.      Right Ear: Hearing, tympanic membrane, ear canal and external ear normal.      Left Ear: Hearing, tympanic membrane, ear canal and external ear normal.      Nose: Nose normal.      Mouth/Throat:      Mouth: Mucous membranes are moist.      Pharynx: Oropharynx is clear.   Eyes:      General: No scleral icterus.     Extraocular Movements: Extraocular movements intact.      Conjunctiva/sclera: Conjunctivae normal.   Neck:      Thyroid: No thyroid mass, thyromegaly or " thyroid tenderness.   Cardiovascular:      Rate and Rhythm: Normal rate and regular rhythm.      Heart sounds: Normal heart sounds. No murmur heard.    No friction rub. No gallop.   Pulmonary:      Effort: Pulmonary effort is normal.      Breath sounds: Normal breath sounds.   Abdominal:      General: Bowel sounds are normal. There is no distension.      Palpations: Abdomen is soft.      Tenderness: There is no abdominal tenderness.   Musculoskeletal:         General: Normal range of motion.      Cervical back: Full passive range of motion without pain, normal range of motion and neck supple.      Right lower leg: No edema.      Left lower leg: No edema.   Lymphadenopathy:      Cervical: No cervical adenopathy.   Skin:     General: Skin is warm and dry.      Capillary Refill: Capillary refill takes less than 2 seconds.      Findings: No rash.   Neurological:      General: No focal deficit present.      Mental Status: She is alert and oriented to person, place, and time.   Psychiatric:         Mood and Affect: Mood normal.         Behavior: Behavior normal.         Thought Content: Thought content normal.         Judgment: Judgment normal.     Assessment:     1. Thyroid nodule    2. Fever blister    3. Fatigue, unspecified type       Plan:   Thyroid nodule  -     T4, Free; Future; Expected date: 11/09/2022  -     T3; Future; Expected date: 11/09/2022  -     Thyrotropin Receptor Antibody; Future; Expected date: 11/09/2022  -     Thyroid Peroxidase Antibody; Future; Expected date: 11/09/2022    Fever blister  -     valACYclovir (VALTREX) 1000 MG tablet; Take 2 tablets (2,000 mg total) by mouth 2 (two) times daily. for 1 day  Dispense: 12 tablet; Refill: 3    Fatigue, unspecified type  -     Vitamin B12; Future; Expected date: 11/09/2022  -     Vitamin D 25-Hydroxy; Future; Expected date: 11/09/2022          No follow-ups on file.    Patient Instructions   ENT, Dr. Pino: [phone]

## 2023-01-06 ENCOUNTER — OFFICE VISIT (OUTPATIENT)
Dept: OTOLARYNGOLOGY | Facility: CLINIC | Age: 43
End: 2023-01-06
Payer: COMMERCIAL

## 2023-01-06 VITALS — WEIGHT: 170 LBS | BODY MASS INDEX: 29.18 KG/M2 | TEMPERATURE: 98 F

## 2023-01-06 DIAGNOSIS — E04.1 THYROID NODULE: Primary | ICD-10-CM

## 2023-01-06 PROCEDURE — 99204 OFFICE O/P NEW MOD 45 MIN: CPT | Mod: S$GLB,,, | Performed by: STUDENT IN AN ORGANIZED HEALTH CARE EDUCATION/TRAINING PROGRAM

## 2023-01-06 PROCEDURE — 3008F PR BODY MASS INDEX (BMI) DOCUMENTED: ICD-10-PCS | Mod: CPTII,S$GLB,, | Performed by: STUDENT IN AN ORGANIZED HEALTH CARE EDUCATION/TRAINING PROGRAM

## 2023-01-06 PROCEDURE — 3008F BODY MASS INDEX DOCD: CPT | Mod: CPTII,S$GLB,, | Performed by: STUDENT IN AN ORGANIZED HEALTH CARE EDUCATION/TRAINING PROGRAM

## 2023-01-06 PROCEDURE — 1159F PR MEDICATION LIST DOCUMENTED IN MEDICAL RECORD: ICD-10-PCS | Mod: CPTII,S$GLB,, | Performed by: STUDENT IN AN ORGANIZED HEALTH CARE EDUCATION/TRAINING PROGRAM

## 2023-01-06 PROCEDURE — 99999 PR PBB SHADOW E&M-EST. PATIENT-LVL III: ICD-10-PCS | Mod: PBBFAC,,, | Performed by: STUDENT IN AN ORGANIZED HEALTH CARE EDUCATION/TRAINING PROGRAM

## 2023-01-06 PROCEDURE — 99204 PR OFFICE/OUTPT VISIT, NEW, LEVL IV, 45-59 MIN: ICD-10-PCS | Mod: S$GLB,,, | Performed by: STUDENT IN AN ORGANIZED HEALTH CARE EDUCATION/TRAINING PROGRAM

## 2023-01-06 PROCEDURE — 99999 PR PBB SHADOW E&M-EST. PATIENT-LVL III: CPT | Mod: PBBFAC,,, | Performed by: STUDENT IN AN ORGANIZED HEALTH CARE EDUCATION/TRAINING PROGRAM

## 2023-01-06 PROCEDURE — 1159F MED LIST DOCD IN RCRD: CPT | Mod: CPTII,S$GLB,, | Performed by: STUDENT IN AN ORGANIZED HEALTH CARE EDUCATION/TRAINING PROGRAM

## 2023-01-06 RX ORDER — DIAZEPAM 5 MG/1
TABLET ORAL
Qty: 1 TABLET | Refills: 0 | Status: SHIPPED | OUTPATIENT
Start: 2023-01-06 | End: 2023-07-18

## 2023-01-06 NOTE — PROGRESS NOTES
Chief complaint:  No chief complaint on file.        Referring Provider:  No referring provider defined for this encounter.    History of present illness:     Ms. Contreras is a 42 y.o. presenting for evaluation of  thyroid nodule/s.     She states that she has not noted a mass in her neck.       Denies pain.       She denies sore throat, dysphagia, otalgia, voice change, hemoptysis.      No family/personal history of thyroid cancer.     No personal history of head and neck cancer/head and neck radiation.    No personal history of hypo- or hyperthyroidism.       History      Past Medical History:   Past Medical History:   Diagnosis Date    History of COVID-19 08/25/2021    Nontoxic single thyroid nodule     .          Past Surgical History:  Past Surgical History:   Procedure Laterality Date    removal of blood clot       lower back            Medications:  She  has a current medication list which includes the following prescription(s): valacyclovir.      Allergies: Review of patient's allergies indicates:  No Known Allergies      Family history: family history includes Arthritis in her paternal grandmother; Cancer in her father, maternal grandmother, and mother; Diabetes in her father; Heart disease in her maternal grandfather and paternal grandfather; Skin cancer in her father and mother.             Social History          Alcohol use:  reports current alcohol use of about 4.0 standard drinks per week.            Tobacco:  reports that she has never smoked. She has never used smokeless tobacco.          Physical Examination     There were no vitals taken for this visit.     The head and neck examination included the following elements which were found to be within normal limits unless otherwise noted below: general appearance, mood and affect, quality of voice, inspection of head and face, cranial nerve examination, external ears, ear canals, and tympanic membranes, external nose, nasal mucosa, septum and turbinates,  lips, oral cavity,oropharynx, larynx, pharyngeal walls and pyriform sinuses, cervical lymphatics,  neck and thyroid bed, pupils and extraocular motion, carotid pulses bilaterally, temporomandibular joint, respiratory assessment for effort, stridor, lung symmetry, and retractions, rashes or lesions on head or neck, and facial nerve function.     Salient findings:        normal voice      palpable right thyroid nodule     no enlarged cervical adenopathy      Data reviewed      Pathology      None    Laboratory    Lab Results   Component Value Date    TSH 1.571 09/30/2022        Lab Results   Component Value Date    CALCIUM 9.6 09/30/2022        Imaging    I have independently reviewed the following imaging with the findings noted below:      US thyroid, 12/16/22:  Impression:     1. Unchanged appearance of the 3.1 cm nodule within the right lobe of the thyroid gland.  This nodule again meets criteria for FNA per TI rads criteria.        Impression/Plan     1. Thyroid nodule      1. Thyroid nodule, without compressive symptoms    - Differential diagnosis includes benign vs malignant thyroid nodule vs metastatic lesion (less likely)     - US concerning for 3.1 cm dominant right thyroid nodule.    I have recommended FNA.     Depending on results of the FNA management may include observation, thyroid lobectomy with staged total thyroidectomy, or total thyroidectomy.     We discussed risks of thyroidectomy such as numbness, poor cosmetic outcome, injury or weakness of the recurrent laryngeal nerve with resultant hoarseness which may be permanent, poor function of the parathyroid glands with need for calcium supplementation which may be permanent, need for thyroid hormone replacement, discovery of cancer with need for further surgery or other treatments, among other risks.      Will call with results and if not benign will see the patient back to discuss further discussion the next options.   \       Roly Galan,  MD [facility]   [facility]  [address] [facility].  [place] [address]  P: [phone]  F: [phone]

## 2023-02-08 ENCOUNTER — HOSPITAL ENCOUNTER (OUTPATIENT)
Dept: RADIOLOGY | Facility: HOSPITAL | Age: 43
Discharge: HOME | End: 2023-02-08
Attending: STUDENT IN AN ORGANIZED HEALTH CARE EDUCATION/TRAINING PROGRAM
Payer: COMMERCIAL

## 2023-02-08 DIAGNOSIS — E04.1 THYROID NODULE: ICD-10-CM

## 2023-02-08 PROCEDURE — 88173 CYTOPATH EVAL FNA REPORT: CPT | Performed by: PATHOLOGY

## 2023-02-08 PROCEDURE — 88173 CYTOPATH EVAL FNA REPORT: CPT | Mod: 26,,, | Performed by: PATHOLOGY

## 2023-02-08 PROCEDURE — 10005 FNA BX W/US GDN 1ST LES: CPT | Mod: ,,, | Performed by: RADIOLOGY

## 2023-02-08 PROCEDURE — 88173 PR  INTERPRETATION OF FNA SMEAR: ICD-10-PCS | Mod: 26,,, | Performed by: PATHOLOGY

## 2023-02-08 PROCEDURE — 10005 US FINE NEEDLE ASPIRATION BIOPSY, FIRST LESION: ICD-10-PCS | Mod: ,,, | Performed by: RADIOLOGY

## 2023-02-08 PROCEDURE — 10005 FNA BX W/US GDN 1ST LES: CPT

## 2023-02-08 NOTE — DISCHARGE SUMMARY
[facility])  Discharge Note  Short Stay    US FNA Biopsy w/ Imaging 1st Lesion      OUTCOME: Patient tolerated treatment/procedure well without complication and is now ready for discharge.    DISPOSITION: Home or Self Care    FINAL DIAGNOSIS:  <principal problem not specified>    FOLLOWUP: In clinic    DISCHARGE INSTRUCTIONS:  No discharge procedures on file.     TIME SPENT ON DISCHARGE: 15 minutes    Pre Op Diagnosis: Right thyroid nodule     Post Op Diagnosis: same     Procedure:  FNA      Procedure performed by: Judson BATES, Bakari MITTAL     Written Informed Consent Obtained: Yes     Specimen Removed:  yes     Estimated Blood Loss:  minimal     Findings: Local anesthesia      The patient tolerated the procedure well and there were no complications.      Disposition:  F/U in clinic or with ordering physician    Discharge instructions:  Light activity for 24 hours.  Remove band aid in 24 hours.  No baths (showers are appropriate).      Sterile technique was performed in the right neck, lidocaine was used as a local anesthetic.  Multiple samples taken percutaneously from the right thyroid nodule.  Pt tolerated the procedure well without immediate complications.  Please see radiologist report for details. F/u with PCP and/or ordering physician.

## 2023-02-08 NOTE — H&P
[facility] ([facility])  History & Physical    Subjective:      Chief Complaint/Reason for Admission: right thyroid nodule    Dominga Contreras is a 42 y.o. female presenting for FNA biopsy of right thyroid nodule.    Past Medical History:   Diagnosis Date    History of COVID-19 2021    Nontoxic single thyroid nodule      Past Surgical History:   Procedure Laterality Date    removal of blood clot       lower back     Family History   Problem Relation Age of Onset    Skin cancer Mother     Cancer Mother         Skin    Diabetes Father     Skin cancer Father     Cancer Father         Skin    Heart disease Maternal Grandfather          of a heart attack in his 70's    Heart disease Paternal Grandfather          of a heart attack in his 50's    Cancer Maternal Grandmother         Lung    Arthritis Paternal Grandmother      Social History     Tobacco Use    Smoking status: Never    Smokeless tobacco: Never   Substance Use Topics    Alcohol use: Yes     Alcohol/week: 4.0 standard drinks     Types: 4 Glasses of wine per week     Comment: occasionally     Drug use: No       (Not in a hospital admission)    Review of patient's allergies indicates:  No Known Allergies     Review of Systems   Constitutional:  Negative for chills and fever.   Respiratory:  Negative for cough.    Neurological:  Negative for dizziness and headaches.   Psychiatric/Behavioral:  The patient is nervous/anxious.    All other systems reviewed and are negative.    Objective:      Vital Signs (Most Recent)       Vital Signs Range (Last 24H):       Physical Exam  Vitals and nursing note reviewed.   Constitutional:       Appearance: Normal appearance.   HENT:      Head: Normocephalic.   Eyes:      Extraocular Movements: Extraocular movements intact.      Conjunctiva/sclera: Conjunctivae normal.   Pulmonary:      Effort: Pulmonary effort is normal.   Musculoskeletal:      Cervical back: Neck supple.   Skin:     General: Skin is  warm.   Neurological:      General: No focal deficit present.      Mental Status: She is alert and oriented to person, place, and time.       Data Review:  CBC:   Lab Results   Component Value Date    WBC 6.43 09/30/2022    RBC 4.87 09/30/2022    HGB 14.1 09/30/2022    HCT 42.3 09/30/2022     09/30/2022      ECG: no prior ECG.     Assessment:      There are no hospital problems to display for this patient.      Plan:    US guided FNA right thyroid

## 2023-02-10 ENCOUNTER — PATIENT MESSAGE (OUTPATIENT)
Dept: OTOLARYNGOLOGY | Facility: CLINIC | Age: 43
End: 2023-02-10
Payer: COMMERCIAL

## 2023-02-10 LAB
ADEQUACY: NORMAL
FINAL PATHOLOGIC DIAGNOSIS: NORMAL
Lab: NORMAL

## 2023-04-03 DIAGNOSIS — Z00.00 ROUTINE GENERAL MEDICAL EXAMINATION AT A HEALTH CARE FACILITY: Primary | ICD-10-CM

## 2023-07-18 ENCOUNTER — CLINICAL SUPPORT (OUTPATIENT)
Dept: INTERNAL MEDICINE | Facility: CLINIC | Age: 43
End: 2023-07-18
Payer: COMMERCIAL

## 2023-07-18 ENCOUNTER — HOSPITAL ENCOUNTER (OUTPATIENT)
Dept: CARDIOLOGY | Facility: HOSPITAL | Age: 43
Discharge: HOME | End: 2023-07-18
Payer: COMMERCIAL

## 2023-07-18 ENCOUNTER — OFFICE VISIT (OUTPATIENT)
Dept: INTERNAL MEDICINE | Facility: CLINIC | Age: 43
End: 2023-07-18
Payer: COMMERCIAL

## 2023-07-18 VITALS
DIASTOLIC BLOOD PRESSURE: 78 MMHG | TEMPERATURE: 97 F | HEART RATE: 76 BPM | HEIGHT: 64 IN | BODY MASS INDEX: 28.51 KG/M2 | SYSTOLIC BLOOD PRESSURE: 122 MMHG | WEIGHT: 167 LBS

## 2023-07-18 DIAGNOSIS — E04.1 THYROID NODULE: ICD-10-CM

## 2023-07-18 DIAGNOSIS — Z00.00 ROUTINE GENERAL MEDICAL EXAMINATION AT A HEALTH CARE FACILITY: ICD-10-CM

## 2023-07-18 DIAGNOSIS — Z00.00 ROUTINE GENERAL MEDICAL EXAMINATION AT A HEALTH CARE FACILITY: Primary | ICD-10-CM

## 2023-07-18 LAB
ALBUMIN SERPL BCP-MCNC: 4.2 G/DL (ref 3.5–5.2)
ALP SERPL-CCNC: 40 U/L (ref 55–135)
ALT SERPL W/O P-5'-P-CCNC: 21 U/L (ref 10–44)
ANION GAP SERPL CALC-SCNC: 11 MMOL/L (ref 8–16)
AST SERPL-CCNC: 22 U/L (ref 10–40)
BILIRUB SERPL-MCNC: 0.5 MG/DL (ref 0.1–1)
BUN SERPL-MCNC: 14 MG/DL (ref 6–20)
CALCIUM SERPL-MCNC: 9.4 MG/DL (ref 8.7–10.5)
CHLORIDE SERPL-SCNC: 106 MMOL/L (ref 95–110)
CHOLEST SERPL-MCNC: 214 MG/DL (ref 120–199)
CHOLEST/HDLC SERPL: 3.2 {RATIO} (ref 2–5)
CO2 SERPL-SCNC: 22 MMOL/L (ref 23–29)
CREAT SERPL-MCNC: 0.8 MG/DL (ref 0.5–1.4)
ERYTHROCYTE [DISTWIDTH] IN BLOOD BY AUTOMATED COUNT: 13 % (ref 11.5–14.5)
EST. GFR  (NO RACE VARIABLE): >60 ML/MIN/1.73 M^2
ESTIMATED AVG GLUCOSE: 94 MG/DL (ref 68–131)
GLUCOSE SERPL-MCNC: 86 MG/DL (ref 70–110)
HBA1C MFR BLD: 4.9 % (ref 4–5.6)
HCT VFR BLD AUTO: 38.8 % (ref 37–48.5)
HDLC SERPL-MCNC: 66 MG/DL (ref 40–75)
HDLC SERPL: 30.8 % (ref 20–50)
HGB BLD-MCNC: 12.9 G/DL (ref 12–16)
LDLC SERPL CALC-MCNC: 109.2 MG/DL (ref 63–159)
MCH RBC QN AUTO: 28.7 PG (ref 27–31)
MCHC RBC AUTO-ENTMCNC: 33.2 G/DL (ref 32–36)
MCV RBC AUTO: 86 FL (ref 82–98)
NONHDLC SERPL-MCNC: 148 MG/DL
PLATELET # BLD AUTO: 272 K/UL (ref 150–450)
PMV BLD AUTO: 9.9 FL (ref 9.2–12.9)
POTASSIUM SERPL-SCNC: 4 MMOL/L (ref 3.5–5.1)
PROT SERPL-MCNC: 7.5 G/DL (ref 6–8.4)
RBC # BLD AUTO: 4.49 M/UL (ref 4–5.4)
SODIUM SERPL-SCNC: 139 MMOL/L (ref 136–145)
TRIGL SERPL-MCNC: 194 MG/DL (ref 30–150)
TSH SERPL DL<=0.005 MIU/L-ACNC: 1.31 UIU/ML (ref 0.4–4)
WBC # BLD AUTO: 5.58 K/UL (ref 3.9–12.7)

## 2023-07-18 PROCEDURE — 99396 PREV VISIT EST AGE 40-64: CPT | Mod: S$GLB,,, | Performed by: INTERNAL MEDICINE

## 2023-07-18 PROCEDURE — 84443 ASSAY THYROID STIM HORMONE: CPT | Performed by: INTERNAL MEDICINE

## 2023-07-18 PROCEDURE — 3074F SYST BP LT 130 MM HG: CPT | Mod: CPTII,S$GLB,, | Performed by: INTERNAL MEDICINE

## 2023-07-18 PROCEDURE — 93010 ELECTROCARDIOGRAM REPORT: CPT | Mod: ,,, | Performed by: INTERNAL MEDICINE

## 2023-07-18 PROCEDURE — 3044F PR MOST RECENT HEMOGLOBIN A1C LEVEL <7.0%: ICD-10-PCS | Mod: CPTII,S$GLB,, | Performed by: INTERNAL MEDICINE

## 2023-07-18 PROCEDURE — 80053 COMPREHEN METABOLIC PANEL: CPT | Performed by: INTERNAL MEDICINE

## 2023-07-18 PROCEDURE — 3078F PR MOST RECENT DIASTOLIC BLOOD PRESSURE < 80 MM HG: ICD-10-PCS | Mod: CPTII,S$GLB,, | Performed by: INTERNAL MEDICINE

## 2023-07-18 PROCEDURE — 3078F DIAST BP <80 MM HG: CPT | Mod: CPTII,S$GLB,, | Performed by: INTERNAL MEDICINE

## 2023-07-18 PROCEDURE — 99396 PR PREVENTIVE VISIT,EST,40-64: ICD-10-PCS | Mod: S$GLB,,, | Performed by: INTERNAL MEDICINE

## 2023-07-18 PROCEDURE — 83036 HEMOGLOBIN GLYCOSYLATED A1C: CPT | Performed by: INTERNAL MEDICINE

## 2023-07-18 PROCEDURE — 99999 PR PBB SHADOW E&M-EST. PATIENT-LVL III: CPT | Mod: PBBFAC,,, | Performed by: INTERNAL MEDICINE

## 2023-07-18 PROCEDURE — 85027 COMPLETE CBC AUTOMATED: CPT | Performed by: INTERNAL MEDICINE

## 2023-07-18 PROCEDURE — 3074F PR MOST RECENT SYSTOLIC BLOOD PRESSURE < 130 MM HG: ICD-10-PCS | Mod: CPTII,S$GLB,, | Performed by: INTERNAL MEDICINE

## 2023-07-18 PROCEDURE — 3044F HG A1C LEVEL LT 7.0%: CPT | Mod: CPTII,S$GLB,, | Performed by: INTERNAL MEDICINE

## 2023-07-18 PROCEDURE — 80061 LIPID PANEL: CPT | Performed by: INTERNAL MEDICINE

## 2023-07-18 PROCEDURE — 93010 EKG 12-LEAD: ICD-10-PCS | Mod: ,,, | Performed by: INTERNAL MEDICINE

## 2023-07-18 PROCEDURE — 1159F MED LIST DOCD IN RCRD: CPT | Mod: CPTII,S$GLB,, | Performed by: INTERNAL MEDICINE

## 2023-07-18 PROCEDURE — 93005 ELECTROCARDIOGRAM TRACING: CPT

## 2023-07-18 PROCEDURE — 3008F PR BODY MASS INDEX (BMI) DOCUMENTED: ICD-10-PCS | Mod: CPTII,S$GLB,, | Performed by: INTERNAL MEDICINE

## 2023-07-18 PROCEDURE — 1159F PR MEDICATION LIST DOCUMENTED IN MEDICAL RECORD: ICD-10-PCS | Mod: CPTII,S$GLB,, | Performed by: INTERNAL MEDICINE

## 2023-07-18 PROCEDURE — 99999 PR PBB SHADOW E&M-EST. PATIENT-LVL III: ICD-10-PCS | Mod: PBBFAC,,, | Performed by: INTERNAL MEDICINE

## 2023-07-18 PROCEDURE — 3008F BODY MASS INDEX DOCD: CPT | Mod: CPTII,S$GLB,, | Performed by: INTERNAL MEDICINE

## 2023-07-18 NOTE — PROGRESS NOTES
"Subjective:      Patient ID: Dominga Contreras is a 42 y.o. female.    Chief Complaint: Executive Health    HPI    42-year-old with it was a pleasure to see you for your executive health physical on July 18, 2023.    Your a 42-year-old with a past medical history of thyroid nodule.    You are not a smoker.    Your past surgical history includes hematoma evacuation and wisdom teeth removal.     Your primary care doctor is Dr. Monique    Family history:  Father- diabetes, fatty liver  Mother- high cholesterol, SVT  Children- healthy    Preventative healthcare:  Tetanus vaccine- declined  Remember flu vaccine should be available in September or October.     Review of Systems   Constitutional:  Negative for chills and fever.   HENT:  Negative for ear pain and sore throat.    Respiratory:  Negative for cough.    Cardiovascular:  Negative for chest pain.   Gastrointestinal:  Negative for abdominal pain and blood in stool.   Genitourinary:  Negative for dysuria and hematuria.   Neurological:  Negative for seizures and syncope.   Objective:   /78   Pulse 76   Temp 97 °F (36.1 °C)   Ht 5' 4" (1.626 m)   Wt 75.8 kg (167 lb)   LMP 07/06/2023   BMI 28.67 kg/m²     Physical Exam  Constitutional:       General: She is not in acute distress.     Appearance: She is well-developed.   HENT:      Head: Normocephalic and atraumatic.   Eyes:      Extraocular Movements: Extraocular movements intact.   Neck:      Thyroid: No thyromegaly.   Cardiovascular:      Rate and Rhythm: Normal rate and regular rhythm.   Pulmonary:      Breath sounds: Normal breath sounds. No wheezing or rales.   Abdominal:      General: Bowel sounds are normal.      Palpations: Abdomen is soft.      Tenderness: There is no abdominal tenderness.   Musculoskeletal:         General: No swelling.      Cervical back: Neck supple. No rigidity.   Lymphadenopathy:      Cervical: No cervical adenopathy.   Skin:     General: Skin is warm and dry.   Neurological:    "   Mental Status: She is alert and oriented to person, place, and time.   Psychiatric:         Behavior: Behavior normal.     Right thyroid nodule.     Lab Results   Component Value Date    WBC 5.58 07/18/2023    HGB 12.9 07/18/2023    HGB 14.1 09/30/2022    HGB 13.3 08/25/2021    HCT 38.8 07/18/2023    MCV 86 07/18/2023    MCV 87 09/30/2022    MCV 88 08/25/2021     07/18/2023    CHOL 214 (H) 07/18/2023    TRIG 194 (H) 07/18/2023    HDL 66 07/18/2023    LDLCALC 109.2 07/18/2023    LDLCALC 137.8 09/30/2022    LDLCALC 126.6 08/25/2021    ALT 21 07/18/2023    AST 22 07/18/2023     07/18/2023    K 4.0 07/18/2023    CALCIUM 9.4 07/18/2023     07/18/2023    CO2 22 (L) 07/18/2023    BUN 14 07/18/2023    CREATININE 0.8 07/18/2023    CREATININE 0.7 09/30/2022    CREATININE 0.8 08/25/2021    EGFRNORACEVR >60 07/18/2023    EGFRNORACEVR >60 09/30/2022    TSH 1.312 07/18/2023    TSH 1.571 09/30/2022    TSH 1.056 08/25/2021    GLU 86 07/18/2023    HGBA1C 4.9 07/18/2023    HGBA1C 4.9 09/30/2022    HGBA1C 5.1 08/25/2021    [id number] 38 11/09/2022          The 10-year ASCVD risk score (Silver ESCALANTE, et al., 2019) is: 0.6%    Values used to calculate the score:      Age: 42 years      Sex: Female      Is Non- : No      Diabetic: No      Tobacco smoker: No      Systolic Blood Pressure: 122 mmHg      Is BP treated: No      HDL Cholesterol: 66 mg/dL      Total Cholesterol: 214 mg/dL     Assessment:     1. Routine general medical examination at a health care facility    2. Thyroid nodule (right)      Plan:   1. Routine general medical examination at a health care facility    2. Thyroid nodule (right)  Overview:  Neg bx 2023  Sees [facility]      Heart healthy diet, regular exercise, and regular use of sunscreen.   HM reviewed  See exec health letter for details.    There are no Patient Instructions on file for this visit.    Future Appointments   Date Time Provider Department Center   7/26/2023  2:30  PM [facility] US3 [facility] ULSOUND [facility]   3/1/2024  8:50 AM [facility]  MAMMO1 SCREEN [facility] MAMMO [facility]   3/1/2024  9:50 AM Elise Galvez MD [facility] OBGYN [facility]       Lab Frequency Next Occurrence   Mammo Digital Screening Bilat w/ Marty Once 09/21/2021   Ambulatory referral/consult to ENT Once 11/12/2022   Ambulatory referral/consult to Endocrinology Once 11/12/2022   US Thyroid Once 07/12/2023       No follow-ups on file.

## 2023-07-26 ENCOUNTER — HOSPITAL ENCOUNTER (OUTPATIENT)
Dept: RADIOLOGY | Facility: HOSPITAL | Age: 43
Discharge: HOME | End: 2023-07-26
Attending: INTERNAL MEDICINE
Payer: COMMERCIAL

## 2023-07-26 DIAGNOSIS — E04.1 THYROID NODULE: ICD-10-CM

## 2023-07-26 PROCEDURE — 76536 US EXAM OF HEAD AND NECK: CPT | Mod: TC

## 2023-07-26 PROCEDURE — 76536 US THYROID: ICD-10-PCS | Mod: 26,,, | Performed by: RADIOLOGY

## 2023-07-26 PROCEDURE — 76536 US EXAM OF HEAD AND NECK: CPT | Mod: 26,,, | Performed by: RADIOLOGY

## 2023-12-07 ENCOUNTER — LAB VISIT (OUTPATIENT)
Dept: LAB | Facility: HOSPITAL | Age: 43
End: 2023-12-07
Attending: FAMILY MEDICINE
Payer: COMMERCIAL

## 2023-12-07 ENCOUNTER — OFFICE VISIT (OUTPATIENT)
Dept: PRIMARY CARE CLINIC | Facility: CLINIC | Age: 43
End: 2023-12-07
Payer: COMMERCIAL

## 2023-12-07 ENCOUNTER — PATIENT MESSAGE (OUTPATIENT)
Dept: PRIMARY CARE CLINIC | Facility: CLINIC | Age: 43
End: 2023-12-07

## 2023-12-07 VITALS
WEIGHT: 176.81 LBS | HEART RATE: 82 BPM | DIASTOLIC BLOOD PRESSURE: 90 MMHG | SYSTOLIC BLOOD PRESSURE: 124 MMHG | OXYGEN SATURATION: 99 % | TEMPERATURE: 97 F | BODY MASS INDEX: 30.35 KG/M2

## 2023-12-07 DIAGNOSIS — M25.552 JOINT PAIN OF LEFT HIP ON MOVEMENT: Primary | ICD-10-CM

## 2023-12-07 DIAGNOSIS — M25.552 JOINT PAIN OF LEFT HIP ON MOVEMENT: ICD-10-CM

## 2023-12-07 DIAGNOSIS — M25.552 BILATERAL HIP PAIN: ICD-10-CM

## 2023-12-07 DIAGNOSIS — M25.50 ARTHRALGIA OF MULTIPLE JOINTS: ICD-10-CM

## 2023-12-07 DIAGNOSIS — M25.551 BILATERAL HIP PAIN: ICD-10-CM

## 2023-12-07 LAB
CCP AB SER IA-ACNC: <0.5 U/ML
CRP SERPL-MCNC: 2.1 MG/L (ref 0–8.2)
URATE SERPL-MCNC: 4.2 MG/DL (ref 2.4–5.7)

## 2023-12-07 PROCEDURE — 3008F PR BODY MASS INDEX (BMI) DOCUMENTED: ICD-10-PCS | Mod: CPTII,S$GLB,, | Performed by: FAMILY MEDICINE

## 2023-12-07 PROCEDURE — 99214 OFFICE O/P EST MOD 30 MIN: CPT | Mod: S$GLB,,, | Performed by: FAMILY MEDICINE

## 2023-12-07 PROCEDURE — 86200 CCP ANTIBODY: CPT | Performed by: FAMILY MEDICINE

## 2023-12-07 PROCEDURE — 36415 COLL VENOUS BLD VENIPUNCTURE: CPT | Mod: PN | Performed by: FAMILY MEDICINE

## 2023-12-07 PROCEDURE — 99999 PR PBB SHADOW E&M-EST. PATIENT-LVL IV: CPT | Mod: PBBFAC,,, | Performed by: FAMILY MEDICINE

## 2023-12-07 PROCEDURE — 3044F PR MOST RECENT HEMOGLOBIN A1C LEVEL <7.0%: ICD-10-PCS | Mod: CPTII,S$GLB,, | Performed by: FAMILY MEDICINE

## 2023-12-07 PROCEDURE — 3074F SYST BP LT 130 MM HG: CPT | Mod: CPTII,S$GLB,, | Performed by: FAMILY MEDICINE

## 2023-12-07 PROCEDURE — 3080F DIAST BP >= 90 MM HG: CPT | Mod: CPTII,S$GLB,, | Performed by: FAMILY MEDICINE

## 2023-12-07 PROCEDURE — 3074F PR MOST RECENT SYSTOLIC BLOOD PRESSURE < 130 MM HG: ICD-10-PCS | Mod: CPTII,S$GLB,, | Performed by: FAMILY MEDICINE

## 2023-12-07 PROCEDURE — 84550 ASSAY OF BLOOD/URIC ACID: CPT | Performed by: FAMILY MEDICINE

## 2023-12-07 PROCEDURE — 86140 C-REACTIVE PROTEIN: CPT | Performed by: FAMILY MEDICINE

## 2023-12-07 PROCEDURE — 86431 RHEUMATOID FACTOR QUANT: CPT | Performed by: FAMILY MEDICINE

## 2023-12-07 PROCEDURE — 99214 PR OFFICE/OUTPT VISIT, EST, LEVL IV, 30-39 MIN: ICD-10-PCS | Mod: S$GLB,,, | Performed by: FAMILY MEDICINE

## 2023-12-07 PROCEDURE — 1159F MED LIST DOCD IN RCRD: CPT | Mod: CPTII,S$GLB,, | Performed by: FAMILY MEDICINE

## 2023-12-07 PROCEDURE — 3080F PR MOST RECENT DIASTOLIC BLOOD PRESSURE >= 90 MM HG: ICD-10-PCS | Mod: CPTII,S$GLB,, | Performed by: FAMILY MEDICINE

## 2023-12-07 PROCEDURE — 86038 ANTINUCLEAR ANTIBODIES: CPT | Performed by: FAMILY MEDICINE

## 2023-12-07 PROCEDURE — 3044F HG A1C LEVEL LT 7.0%: CPT | Mod: CPTII,S$GLB,, | Performed by: FAMILY MEDICINE

## 2023-12-07 PROCEDURE — 3008F BODY MASS INDEX DOCD: CPT | Mod: CPTII,S$GLB,, | Performed by: FAMILY MEDICINE

## 2023-12-07 PROCEDURE — 1159F PR MEDICATION LIST DOCUMENTED IN MEDICAL RECORD: ICD-10-PCS | Mod: CPTII,S$GLB,, | Performed by: FAMILY MEDICINE

## 2023-12-07 PROCEDURE — 99999 PR PBB SHADOW E&M-EST. PATIENT-LVL IV: ICD-10-PCS | Mod: PBBFAC,,, | Performed by: FAMILY MEDICINE

## 2023-12-07 PROCEDURE — 85652 RBC SED RATE AUTOMATED: CPT | Performed by: FAMILY MEDICINE

## 2023-12-07 RX ORDER — MELOXICAM 15 MG/1
15 TABLET ORAL DAILY
Qty: 30 TABLET | Refills: 1 | Status: SHIPPED | OUTPATIENT
Start: 2023-12-07

## 2023-12-07 NOTE — PROGRESS NOTES
Subjective:      Patient ID: Dominga Contreras is a 43 y.o. female.    Chief Complaint: Hip Pain (Reports left hip and joint pain comes and goes for about a year.)      Patient is a 43 y.o. female coming in today for hip pain and arthralgias.    Reports sx onset a few weeks ago. States pain worsens with movement. Reports similar sx presentation in the past. Was instructed to do PT in the past but never started it. Pain is worse in left hip than in the right. Latest imaging reviewed with pt. Currently only on Valtrex. Reports taking vitamins supplements occasionally. Denies recent injury or trauma. Discussed at length treatment and workup for hip pain relief.       1. Joint pain of left hip on movement    2. Arthralgia of multiple joints    3. Bilateral hip pain       [facility] Reason For Visit    12/4/2023 10:21 AM CST - Filed by Patient   What is your primary reason for visit? Other/Annual   Have you experienced any of the following:   Change in activity? No   Unexpected weight change? Yes   Neck pain? Yes   Hearing loss? Yes   Runny nose? No   Trouble swallowing? No   Eye discharge? No   Changes in vision? No   Chest tightness? No   Wheezing? No   Chest pain? No   Heart beating fast or racing? Yes   Blood in stool? No   Constipation? No   Vomiting? No   Diarrhea? No   Drinking much more than usual? No   Urinating much more than usual? No   Difficulty urinating? No   Blood in the urine? No   Menstrual problem? No   Painful urination? No   Joint swelling? No   Joint pain? Yes   Headaches? No   Weakness? No   Confusion? No   Feeling depressed? No     [facility] Peq Sdoh    12/4/2023 10:23 AM CST - Filed by Patient   This questionnaire should take approximately 5 to 10 minutes to complete.  To begin, press Let's Begin and then press Continue. Let's Begin   On average, how many days per week do you engage in moderate to strenuous exercise (like a brisk walk)? 2 days   On average, how many minutes do you engage in exercise at  this level? 30 min   Do you feel stress - tense, restless, nervous, or anxious, or unable to sleep at night because your mind is troubled all the time - these days? Not at all   Do you belong to any clubs or organizations such as Synagogue groups, unions, fraternal or athletic groups, or school groups? No   How often do you attend meetings of the clubs or organizations you belong to? Never   In a typical week, how many times do you talk on the phone with family, friends, or neighbors? More than three times a week   How often do you get together with friends or relatives? Once a week   Are you , , , , never , or living with a partner?    How hard is it for you to pay for the very basics like food, housing, medical care, and heating? Not hard at all   Within the past 12 months, you worried that your food would run out before you got the money to buy more. Never true   Within the past 12 months, the food you bought just didnt last and you didnt have money to get more. Never true   In the past 12 months, has lack of transportation kept you from medical appointments or from getting medications? No   In the past 12 months, has lack of transportation kept you from meetings, work, or from getting things needed for daily living? No   How often do you have a drink containing alcohol? 2-3 times a week   How many drinks containing alcohol do you have on a typical day when you are drinking? 1 or 2   How often do you have six or more drinks on one occasion? Never   In the last 12 months, was there a time when you were not able to pay the mortgage or rent on time? No   In the last 12 months, how many places have you lived? (range: at least 0)    In the last 12 months, was there a time when you did not have a steady place to sleep or slept in a shelter (including now)? No         Pmh, Psh, Family Hx, Social Hx, HM updated in Epic Tabs today.   Review of Systems   Constitutional:  Positive  for unexpected weight change. Negative for activity change.   HENT:  Positive for hearing loss. Negative for rhinorrhea and trouble swallowing.    Eyes:  Negative for discharge and visual disturbance.   Respiratory:  Negative for chest tightness and wheezing.    Cardiovascular:  Positive for palpitations. Negative for chest pain.   Gastrointestinal:  Negative for blood in stool, constipation, diarrhea and vomiting.   Endocrine: Negative for polydipsia and polyuria.   Genitourinary:  Negative for difficulty urinating, dysuria, hematuria and menstrual problem.   Musculoskeletal:  Positive for arthralgias and neck pain. Negative for joint swelling.        + Hip pain   Neurological:  Negative for weakness and headaches.   Psychiatric/Behavioral:  Negative for confusion and dysphoric mood.      Objective:     Vitals:    12/07/23 1428   BP: (!) 124/90   Pulse: 82   Temp: 97.3 °F (36.3 °C)   SpO2: 99%   Weight: 80.2 kg (176 lb 12.9 oz)     Wt Readings from Last 10 Encounters:   12/07/23 80.2 kg (176 lb 12.9 oz)   07/18/23 75.8 kg (167 lb)   02/22/23 75.8 kg (167 lb)   01/06/23 77.1 kg (169 lb 15.6 oz)   12/07/22 75.1 kg (165 lb 9.1 oz)   11/22/22 75.8 kg (167 lb 1.7 oz)   11/09/22 73.2 kg (161 lb 7.8 oz)   09/30/22 74 kg (163 lb 2.3 oz)   01/27/22 73.2 kg (161 lb 6 oz)   12/08/21 71.2 kg (157 lb)     Physical Exam  Vitals reviewed.   Constitutional:       Appearance: Normal appearance. She is well-developed. She is obese.   HENT:      Head: Normocephalic and atraumatic.      Nose: Nose normal.      Mouth/Throat:      Mouth: Mucous membranes are moist.      Pharynx: Oropharynx is clear.   Eyes:      Conjunctiva/sclera: Conjunctivae normal.      Pupils: Pupils are equal, round, and reactive to light.   Neck:      Thyroid: No thyromegaly.   Cardiovascular:      Rate and Rhythm: Normal rate and regular rhythm.      Heart sounds: Normal heart sounds. No murmur heard.     No friction rub. No gallop.   Pulmonary:      Effort:  Pulmonary effort is normal. No respiratory distress.      Breath sounds: Normal breath sounds. No wheezing or rales.   Abdominal:      General: Bowel sounds are normal. There is no distension.      Palpations: Abdomen is soft.      Tenderness: There is no abdominal tenderness. There is no rebound.   Musculoskeletal:      Cervical back: Normal range of motion and neck supple. Spasms and tenderness present.      Thoracic back: Normal.      Lumbar back: Normal.      Right hip: Tenderness and bony tenderness present. Decreased range of motion.      Left hip: Tenderness and bony tenderness present. Decreased range of motion.      Right upper leg: Normal.      Left upper leg: Normal.      Comments: Left hip pain worse than right   Lymphadenopathy:      Cervical: No cervical adenopathy.   Skin:     General: Skin is warm and dry.      Findings: No rash.   Neurological:      Mental Status: She is alert and oriented to person, place, and time.   Psychiatric:         Attention and Perception: Attention and perception normal.         Mood and Affect: Mood and affect normal.         Speech: Speech normal.         Behavior: Behavior normal.         Thought Content: Thought content normal.         Cognition and Memory: Cognition and memory normal.         Judgment: Judgment normal.         Assessment:     1. Joint pain of left hip on movement    2. Arthralgia of multiple joints    3. Bilateral hip pain        Plan:   Dominga was seen today for hip pain.    Diagnoses and all orders for this visit:    Joint pain of left hip on movement  -     Cancel: X-Ray Hip 2 or 3 views Left (with Pelvis when performed); Future  -     Sedimentation rate; Future  -     C-Reactive Protein; Future  -     SONA Screen w/Reflex; Future  -     CYCLIC CITRUL PEPTIDE ANTIBODY, IGG; Future  -     Rheumatoid Factor; Future  -     X-Ray Hips Bilateral 2 View Inc AP Pelvis; Future  -     Uric Acid; Future  -     meloxicam (MOBIC) 15 MG tablet; Take 1 tablet  (15 mg total) by mouth once daily.  -     Ambulatory referral/consult to Physical/Occupational Therapy; Future  -     Ambulatory referral/consult to Orthopedics; Future    Arthralgia of multiple joints  -     Sedimentation rate; Future  -     C-Reactive Protein; Future  -     SONA Screen w/Reflex; Future  -     CYCLIC CITRUL PEPTIDE ANTIBODY, IGG; Future  -     Rheumatoid Factor; Future  -     Uric Acid; Future  -     meloxicam (MOBIC) 15 MG tablet; Take 1 tablet (15 mg total) by mouth once daily.  -     Ambulatory referral/consult to Physical/Occupational Therapy; Future  -     Ambulatory referral/consult to Orthopedics; Future    Bilateral hip pain  -     X-Ray Hips Bilateral 2 View Inc AP Pelvis; Future  -     Uric Acid; Future  -     meloxicam (MOBIC) 15 MG tablet; Take 1 tablet (15 mg total) by mouth once daily.  -     Ambulatory referral/consult to Physical/Occupational Therapy; Future  -     Ambulatory referral/consult to Orthopedics; Future      Bilateral hip pain with movement is new problem and is persisting, with Left worse than Right, lalo with movement of internal rotation of hip.   Hip x-rays ordered to be completed this week to assess for hip pain etiology.   Lab work ordered to be completed today to check inflammatory markers and systemic causes.    Switched to Rx Mobic 15 mg/day for hip pain treatment. Rx given.   Referral given to PT to assist with hip pain treatment and orthopedics referral also.    Instructed to f/u if sx persist or worsen.     There are no Patient Instructions on file for this visit.    Follow up if symptoms worsen or fail to improve.      LABS:   Lab Results   Component Value Date    HGBA1C 4.9 07/18/2023    HGBA1C 4.9 09/30/2022    HGBA1C 5.1 08/25/2021      Lab Results   Component Value Date    CHOL 214 (H) 07/18/2023    CHOL 226 (H) 09/30/2022    CHOL 214 (H) 08/25/2021     Lab Results   Component Value Date    LDLCALC 109.2 07/18/2023    LDLCALC 137.8 09/30/2022    LDLCALC  126.6 08/25/2021     Lab Results   Component Value Date    WBC 5.58 07/18/2023    HGB 12.9 07/18/2023    HCT 38.8 07/18/2023     07/18/2023    CHOL 214 (H) 07/18/2023    TRIG 194 (H) 07/18/2023    HDL 66 07/18/2023    ALT 21 07/18/2023    AST 22 07/18/2023     07/18/2023    K 4.0 07/18/2023     07/18/2023    CREATININE 0.8 07/18/2023    BUN 14 07/18/2023    CO2 22 (L) 07/18/2023    TSH 1.312 07/18/2023    HGBA1C 4.9 07/18/2023       The 10-year ASCVD risk score (Silver ESCALANTE, et al., 2019) is: 0.6%    Values used to calculate the score:      Age: 43 years      Sex: Female      Is Non- : No      Diabetic: No      Tobacco smoker: No      Systolic Blood Pressure: 124 mmHg      Is BP treated: No      HDL Cholesterol: 66 mg/dL      Total Cholesterol: 214 mg/dL  US Thyroid  Narrative: EXAMINATION:  US THYROID    CLINICAL HISTORY:  Nontoxic single thyroid nodule    TECHNIQUE:  Ultrasound of the thyroid and cervical lymph nodes was performed.    COMPARISON:  Prior ultrasounds    FINDINGS:  Right lobe: 5.2 x 2.1 x 1.7 cm    Left lobe: 5.2 x 1.6 x 1.4 cm    Isthmus: 0.3 cm    Echotexture: Normal    Nodules: Solid hypoechoic nodule at the right lobe lower pole is similar in appearance currently measuring 2.9 x 1.6 x 1.7 cm, previous 3.1 x 1.4 x 1.6 cm.  Nearby 6 mm cystic nodule noted.  No new nodule.  Impression: No detrimental change    Electronically signed by: Andrey Field MD  Date:    07/26/2023  Time:    15:12    Scribe Attestation:   I, Jorge Bland, am scribing for, and in the presence of, Dr. Emely Monique MD. I performed the above scribed service and the documentation accurately describes the services I performed. I attest to the accuracy of the note.    I, Dr. Emely Monique MD, reviewed documentation as scribed above. I personally performed the services described in this documentation.  I agree that the record reflects my personal performance and is accurate and  complete. Emely Monique MD.    12/07/2023

## 2023-12-08 ENCOUNTER — PATIENT MESSAGE (OUTPATIENT)
Dept: SPORTS MEDICINE | Facility: CLINIC | Age: 43
End: 2023-12-08
Payer: COMMERCIAL

## 2023-12-08 LAB
ANA SER QL IF: NORMAL
ERYTHROCYTE [SEDIMENTATION RATE] IN BLOOD BY PHOTOMETRIC METHOD: 10 MM/HR (ref 0–36)
RHEUMATOID FACT SERPL-ACNC: <13 IU/ML (ref 0–15)

## 2023-12-16 ENCOUNTER — HOSPITAL ENCOUNTER (OUTPATIENT)
Dept: RADIOLOGY | Facility: HOSPITAL | Age: 43
Discharge: HOME | End: 2023-12-16
Attending: FAMILY MEDICINE
Payer: COMMERCIAL

## 2023-12-16 DIAGNOSIS — M25.551 BILATERAL HIP PAIN: ICD-10-CM

## 2023-12-16 DIAGNOSIS — M25.552 JOINT PAIN OF LEFT HIP ON MOVEMENT: ICD-10-CM

## 2023-12-16 DIAGNOSIS — M25.552 BILATERAL HIP PAIN: ICD-10-CM

## 2023-12-16 PROCEDURE — 73521 XR HIPS BILATERAL 2 VIEW INCL AP PELVIS: ICD-10-PCS | Mod: 26,,, | Performed by: RADIOLOGY

## 2023-12-16 PROCEDURE — 73521 X-RAY EXAM HIPS BI 2 VIEWS: CPT | Mod: 26,,, | Performed by: RADIOLOGY

## 2023-12-16 PROCEDURE — 73521 X-RAY EXAM HIPS BI 2 VIEWS: CPT | Mod: TC

## 2023-12-17 ENCOUNTER — OFFICE VISIT (OUTPATIENT)
Dept: URGENT CARE | Facility: CLINIC | Age: 43
End: 2023-12-17
Payer: COMMERCIAL

## 2023-12-17 VITALS
WEIGHT: 180.75 LBS | DIASTOLIC BLOOD PRESSURE: 78 MMHG | TEMPERATURE: 98 F | HEART RATE: 73 BPM | HEIGHT: 66 IN | OXYGEN SATURATION: 99 % | SYSTOLIC BLOOD PRESSURE: 140 MMHG | BODY MASS INDEX: 29.05 KG/M2 | RESPIRATION RATE: 18 BRPM

## 2023-12-17 DIAGNOSIS — R51.9 NONINTRACTABLE HEADACHE, UNSPECIFIED CHRONICITY PATTERN, UNSPECIFIED HEADACHE TYPE: ICD-10-CM

## 2023-12-17 DIAGNOSIS — R03.0 BLOOD PRESSURE ELEVATED WITHOUT HISTORY OF HTN: ICD-10-CM

## 2023-12-17 DIAGNOSIS — R05.1 ACUTE COUGH: ICD-10-CM

## 2023-12-17 DIAGNOSIS — R09.82 POSTNASAL DISCHARGE: Primary | ICD-10-CM

## 2023-12-17 LAB
CTP QC/QA: YES
POC MOLECULAR INFLUENZA A AGN: NEGATIVE
POC MOLECULAR INFLUENZA B AGN: NEGATIVE

## 2023-12-17 PROCEDURE — 99213 OFFICE O/P EST LOW 20 MIN: CPT | Mod: S$GLB,,,

## 2023-12-17 PROCEDURE — 87502 INFLUENZA DNA AMP PROBE: CPT | Mod: QW,S$GLB,,

## 2023-12-17 PROCEDURE — 87502 POCT INFLUENZA A/B MOLECULAR: ICD-10-PCS | Mod: QW,S$GLB,,

## 2023-12-17 PROCEDURE — 99213 PR OFFICE/OUTPT VISIT, EST, LEVL III, 20-29 MIN: ICD-10-PCS | Mod: S$GLB,,,

## 2023-12-17 RX ORDER — FLUTICASONE PROPIONATE 50 MCG
1 SPRAY, SUSPENSION (ML) NASAL DAILY
Qty: 16 ML | Refills: 0 | Status: SHIPPED | OUTPATIENT
Start: 2023-12-17 | End: 2023-12-27

## 2023-12-17 RX ORDER — CETIRIZINE HYDROCHLORIDE 10 MG/1
10 TABLET ORAL DAILY
Qty: 10 TABLET | Refills: 0 | Status: SHIPPED | OUTPATIENT
Start: 2023-12-17 | End: 2024-01-12

## 2023-12-17 NOTE — PATIENT INSTRUCTIONS
Patient informed of negative flu results      General Instructions for Upper Respiratory Infection (URI):     Alternate Tylenol and Ibuprofen every 3 hrs for fever, pain and inflammation.   Avoid NSAIDs (Ibuprofen, Aleve, Motrin, Aspirin) if you are pregnant, or have advanced kidney disease or history of stomach ulcers/bleeding.     Sore throat/Post Nasal Drip:  Salt water gargles, chloraseptic spray, lozenges, or cough drops   Honey/lemon water or warm tea   Cepachol   Zantac will help if there is reflux from the post nasal drip and helpful to take at night     Sinus Congestion/Runny nose:  Zyrtec/Claritin/Allegra during the day and Benadryl at night as needed  Mucinex, Dayquil, or Coricidin   If you DO NOT have Hypertension (high blood pressure) or any history of palpitations, it is ok to take over the counter Sudafed or Mucinex D or Allegra-D or Claritin-D or Zyrtec-D.  If you do take one of the above, it is ok to combine that with plain over the counter Mucinex or Allegra or Claritin or Zyrtec. If, for example, you are taking Zyrtec -D, you can combine that with Mucinex, but not Mucinex-D. If you are taking Mucinex-D, you can combine that with plain Allegra or Claritin or Zyrtec.  If you DO have Hypertension or palpitations, it is safe to take Coricidin HBP for relief of sinus symptoms.  Nasal saline spray reduces inflammation and dryness  Flonase OTC or Nasacort OTC to help decrease inflammation in nasal turbinates and allow sinuses to drain  Warm face compresses/hot showers as often as you can to open sinuses and allow to drain.   Vicks vapor rub and/or humidifier at night  Cold-eeze helps to reduce the duration of URI symptoms if taken early  Elderberry, Emergen-C, and/or Zinc to reduce duration of viral URI symptoms    Cough:  Robitussin or Delsym as needed  Cough drops  Vicks vapor rub and/or humidifier at night       Rest as much as you can     Your symptoms are likely viral and will typically last 7-10  days, maybe longer depending on how it affects your body.  You are contagious until day 5-7, so minimize contact with others to reduce the spread to others and stay home from work or school as we discussed. Dehydration is preventable but is one of the main reasons why you will feel so badly. Drink pedialyte, gatorade or propel. Stay hydrated.  Antibiotics are not needed unless a complication( such as Otitis Media, Bacterial sinus infection or pneumonia) develops. Taking antibiotics for Flu/Cold is not supported by evidence-based medicine and can expose you to unnecessary side effects of the medication, such as anaphylaxis, yeast infection and leads to antibiotic resistance.     Please follow up with your primary care provider within 5-7 days if your signs and symptoms have not resolved or worsen.  If your condition worsens or fails to improve we recommend that you receive another evaluation at the emergency  room immediately or contact your primary medical clinic to discuss your concerns.  You must understand that you have received an Urgent Care treatment only and that you may be released before all of  your medical problems are known or treated. You, the patient, will arrange for follow up care as instructed.    Go to Emergency Room immediately if you experience any:  Chest pain, shortness of breath, wheezing or difficulty breathing,  Severe headache, face, neck or ear pain,  New rash,  Fever over 101.5º F (38.6 C) for more than three days,  Confusion, behavior change or seizure,  Severe weakness or dizziness or passing out

## 2023-12-17 NOTE — PROGRESS NOTES
"Subjective:      Patient ID: Dominga Contreras is a 43 y.o. female.    Vitals:  height is 5' 6.42" (1.687 m) and weight is 82 kg (180 lb 12.4 oz). Her oral temperature is 98.1 °F (36.7 °C). Her blood pressure is 140/78 (abnormal) and her pulse is 73. Her respiration is 18 and oxygen saturation is 99%.     Chief Complaint: Cough (Need to be swabbed for flu. Was around someone who tested positive a on Wednesday and Ive had headache, nasal congestion and cough for the last few days. - Entered by patient)    Patient presents today with cough, congestion since Wednesday. She states that everything started with a bad headache.    Cough  This is a new problem. The current episode started in the past 7 days. The problem has been unchanged. The problem occurs constantly. The cough is Non-productive. Associated symptoms include ear congestion, headaches and nasal congestion. Pertinent negatives include no chest pain, chills, fever, rash or shortness of breath. Treatments tried: advil cold and flu, mucinex. The treatment provided mild relief.       Constitution: Negative for chills and fever.   HENT:  Negative for congestion and sinus pressure.    Cardiovascular:  Negative for chest pain and palpitations.   Respiratory:  Positive for cough. Negative for shortness of breath.    Gastrointestinal:  Negative for nausea and vomiting.   Musculoskeletal:  Negative for joint pain.   Skin:  Negative for rash.   Neurological:  Positive for headaches.      Objective:     Physical Exam   Constitutional: She is oriented to person, place, and time. normal  HENT:   Head: Normocephalic and atraumatic.   Ears:   Right Ear: Tympanic membrane, external ear and ear canal normal.   Left Ear: Tympanic membrane, external ear and ear canal normal.   Mouth/Throat: Mucous membranes are moist.      Comments: Postnasal drainage   Eyes: Pupils are equal, round, and reactive to light.   Neck: Neck supple.   Cardiovascular: Normal rate, regular rhythm, " normal heart sounds and normal pulses.   Pulmonary/Chest: Effort normal and breath sounds normal.   Abdominal: Normal appearance.   Musculoskeletal: Normal range of motion.         General: Normal range of motion.   Neurological: no focal deficit. She is alert and oriented to person, place, and time.   Skin: Skin is warm and dry. Capillary refill takes less than 2 seconds.   Psychiatric: Mood normal.       Assessment:     1. Postnasal discharge    2. Acute cough    3. Nonintractable headache, unspecified chronicity pattern, unspecified headache type    4. Blood pressure elevated without history of HTN          Plan:     Patient Instructions   Patient informed of negative flu results      General Instructions for Upper Respiratory Infection (URI):     Alternate Tylenol and Ibuprofen every 3 hrs for fever, pain and inflammation.   Avoid NSAIDs (Ibuprofen, Aleve, Motrin, Aspirin) if you are pregnant, or have advanced kidney disease or history of stomach ulcers/bleeding.     Sore throat/Post Nasal Drip:  Salt water gargles, chloraseptic spray, lozenges, or cough drops   Honey/lemon water or warm tea   Cepachol   Zantac will help if there is reflux from the post nasal drip and helpful to take at night     Sinus Congestion/Runny nose:  Zyrtec/Claritin/Allegra during the day and Benadryl at night as needed  Mucinex, Dayquil, or Coricidin   If you DO NOT have Hypertension (high blood pressure) or any history of palpitations, it is ok to take over the counter Sudafed or Mucinex D or Allegra-D or Claritin-D or Zyrtec-D.  If you do take one of the above, it is ok to combine that with plain over the counter Mucinex or Allegra or Claritin or Zyrtec. If, for example, you are taking Zyrtec -D, you can combine that with Mucinex, but not Mucinex-D. If you are taking Mucinex-D, you can combine that with plain Allegra or Claritin or Zyrtec.  If you DO have Hypertension or palpitations, it is safe to take Coricidin HBP for relief of  sinus symptoms.  Nasal saline spray reduces inflammation and dryness  Flonase OTC or Nasacort OTC to help decrease inflammation in nasal turbinates and allow sinuses to drain  Warm face compresses/hot showers as often as you can to open sinuses and allow to drain.   Vicks vapor rub and/or humidifier at night  Cold-eeze helps to reduce the duration of URI symptoms if taken early  Elderberry, Emergen-C, and/or Zinc to reduce duration of viral URI symptoms    Cough:  Robitussin or Delsym as needed  Cough drops  Vicks vapor rub and/or humidifier at night       Rest as much as you can     Your symptoms are likely viral and will typically last 7-10 days, maybe longer depending on how it affects your body.  You are contagious until day 5-7, so minimize contact with others to reduce the spread to others and stay home from work or school as we discussed. Dehydration is preventable but is one of the main reasons why you will feel so badly. Drink pedialyte, gatorade or propel. Stay hydrated.  Antibiotics are not needed unless a complication( such as Otitis Media, Bacterial sinus infection or pneumonia) develops. Taking antibiotics for Flu/Cold is not supported by evidence-based medicine and can expose you to unnecessary side effects of the medication, such as anaphylaxis, yeast infection and leads to antibiotic resistance.     Please follow up with your primary care provider within 5-7 days if your signs and symptoms have not resolved or worsen.  If your condition worsens or fails to improve we recommend that you receive another evaluation at the emergency  room immediately or contact your primary medical clinic to discuss your concerns.  You must understand that you have received an Urgent Care treatment only and that you may be released before all of  your medical problems are known or treated. You, the patient, will arrange for follow up care as instructed.    Go to Emergency Room immediately if you experience any:  Chest  pain, shortness of breath, wheezing or difficulty breathing,  Severe headache, face, neck or ear pain,  New rash,  Fever over 101.5º F (38.6 C) for more than three days,  Confusion, behavior change or seizure,  Severe weakness or dizziness or passing out      Postnasal discharge  -     fluticasone propionate (FLONASE) 50 mcg/actuation nasal spray; 1 spray (50 mcg total) by Each Nostril route once daily. for 10 days  Dispense: 16 mL; Refill: 0  -     cetirizine (ZYRTEC) 10 MG tablet; Take 1 tablet (10 mg total) by mouth once daily. for 10 days  Dispense: 10 tablet; Refill: 0    Acute cough    Nonintractable headache, unspecified chronicity pattern, unspecified headache type  -     POCT Influenza A/B MOLECULAR    Blood pressure elevated without history of HTN    Follow up with PCP for blood pressure

## 2024-01-12 ENCOUNTER — OFFICE VISIT (OUTPATIENT)
Dept: SPORTS MEDICINE | Facility: CLINIC | Age: 44
End: 2024-01-12
Payer: COMMERCIAL

## 2024-01-12 VITALS — BODY MASS INDEX: 29.05 KG/M2 | WEIGHT: 180.75 LBS | HEIGHT: 66 IN

## 2024-01-12 DIAGNOSIS — M25.859 FEMORAL ACETABULAR IMPINGEMENT: Primary | ICD-10-CM

## 2024-01-12 DIAGNOSIS — M25.552 JOINT PAIN OF LEFT HIP ON MOVEMENT: ICD-10-CM

## 2024-01-12 DIAGNOSIS — M25.552 BILATERAL HIP PAIN: ICD-10-CM

## 2024-01-12 DIAGNOSIS — M25.551 BILATERAL HIP PAIN: ICD-10-CM

## 2024-01-12 DIAGNOSIS — M25.50 ARTHRALGIA OF MULTIPLE JOINTS: ICD-10-CM

## 2024-01-12 PROCEDURE — 99999 PR PBB SHADOW E&M-EST. PATIENT-LVL III: CPT | Mod: PBBFAC,,, | Performed by: STUDENT IN AN ORGANIZED HEALTH CARE EDUCATION/TRAINING PROGRAM

## 2024-01-12 PROCEDURE — 99204 OFFICE O/P NEW MOD 45 MIN: CPT | Mod: S$GLB,,, | Performed by: STUDENT IN AN ORGANIZED HEALTH CARE EDUCATION/TRAINING PROGRAM

## 2024-01-12 PROCEDURE — 1159F MED LIST DOCD IN RCRD: CPT | Mod: CPTII,S$GLB,, | Performed by: STUDENT IN AN ORGANIZED HEALTH CARE EDUCATION/TRAINING PROGRAM

## 2024-01-12 PROCEDURE — 3008F BODY MASS INDEX DOCD: CPT | Mod: CPTII,S$GLB,, | Performed by: STUDENT IN AN ORGANIZED HEALTH CARE EDUCATION/TRAINING PROGRAM

## 2024-01-12 RX ORDER — IBUPROFEN 200 MG
200 TABLET ORAL EVERY 6 HOURS PRN
COMMUNITY

## 2024-01-12 RX ORDER — AMOXICILLIN 875 MG/1
875 TABLET, FILM COATED ORAL EVERY 12 HOURS
COMMUNITY
Start: 2024-01-03

## 2024-01-12 NOTE — PROGRESS NOTES
Patient ID: Dominga Contreras  YOB: 1980  MRN: [id number]    Chief Complaint: Pain of the Left Hip    Referred By:  Dr. Monique for bilateral hip pain left greater than right    History of Present Illness: Dominga Contreras is a right-hand dominant 43 y.o. female who presents today with bilateral hip pain left greater than right.     The patient is active in  previous cheerleader and dancer, enjoys walking and working out at the gym. .      43-year-old female presenting today for bilateral hip pain left greater than right.  Years of symptoms with acutely worsening most recently.  Mostly in the groin she has not done anything specific try make it better.  She notes worsening pain after extended amount of activity and in the past used only take a day or 2 to fully improve and get back to baseline but since that time the episodes have become more frequent and the recovery time has extend it as well.    Past Medical History:   Past Medical History:   Diagnosis Date    History of COVID-19 2021    Nontoxic single thyroid nodule      Past Surgical History:   Procedure Laterality Date    BIOPSY OF THYROID      benign    removal of blood clot       lower back     Family History   Problem Relation Age of Onset    Skin cancer Mother     Cancer Mother         Skin    Diabetes Father     Skin cancer Father     Cancer Father         Skin    Heart disease Maternal Grandfather          of a heart attack in his 70's    Heart disease Paternal Grandfather          of a heart attack in his 50's    Cancer Maternal Grandmother         Lung    Arthritis Paternal Grandmother      Social History     Socioeconomic History    Marital status:     Number of children: 3   Occupational History    Occupation:  [employer]    Tobacco Use    Smoking status: Never    Smokeless tobacco: Never   Substance and Sexual Activity    Alcohol use: Yes     Alcohol/week: 4.0 standard drinks of alcohol      Types: 4 Glasses of wine per week     Comment: occasionally     Drug use: No    Sexual activity: Yes     Partners: Male     Birth control/protection: Partner-Vasectomy   Social History Narrative    . Advertising.      Social Determinants of Health     Financial Resource Strain: Low Risk  (12/4/2023)    Overall Financial Resource Strain (CARDIA)     Difficulty of Paying Living Expenses: Not hard at all   Food Insecurity: No Food Insecurity (12/4/2023)    Hunger Vital Sign     Worried About Running Out of Food in the Last Year: Never true     Ran Out of Food in the Last Year: Never true   Transportation Needs: No Transportation Needs (12/4/2023)    PRAPARE - Transportation     Lack of Transportation (Medical): No     Lack of Transportation (Non-Medical): No   Physical Activity: Insufficiently Active (12/4/2023)    Exercise Vital Sign     Days of Exercise per Week: 2 days     Minutes of Exercise per Session: 30 min   Stress: No Stress Concern Present (12/4/2023)    [employer] [place] [employer] - Occupational Stress Questionnaire     Feeling of Stress : Not at all   Social Connections: Unknown (12/4/2023)    Social Connection and Isolation Panel [NHANES]     Frequency of Communication with Friends and Family: More than three times a week     Frequency of Social Gatherings with Friends and Family: Once a week     Active Member of Clubs or Organizations: No     Attends Club or Organization Meetings: Never     Marital Status:    Housing Stability: Unknown (12/4/2023)    Housing Stability Vital Sign     Unable to Pay for Housing in the Last Year: No     Unstable Housing in the Last Year: No     Medication List with Changes/Refills   Current Medications    AMOXICILLIN (AMOXIL) 875 MG TABLET    Take 875 mg by mouth every 12 (twelve) hours.    CETIRIZINE (ZYRTEC) 10 MG TABLET    Take 1 tablet (10 mg total) by mouth once daily. for 10 days    IBUPROFEN (ADVIL,MOTRIN) 200 MG TABLET    Take 200 mg by  mouth every 6 (six) hours as needed for Pain.    MELOXICAM (MOBIC) 15 MG TABLET    Take 1 tablet (15 mg total) by mouth once daily.    VALACYCLOVIR (VALTREX) 1000 MG TABLET    Take 2 tablets (2,000 mg total) by mouth 2 (two) times daily. for 1 day     Review of patient's allergies indicates:  No Known Allergies    Physical Exam:   Body mass index is 28.81 kg/m².    GENERAL: Well appearing, in no acute distress.  HEAD: Normocephalic and atraumatic.  ENT: External ears and nose grossly normal.  EYES: EOMI bilaterally  PULMONARY: Respirations are grossly even and non-labored.  NEURO: Awake, alert, and oriented x 3.  SKIN: No obvious rashes appreciated.  PSYCH: Mood & affect are appropriate.    Detailed MSK exam:     Bilateral hip exam   Right hip   Negative logroll range of motion 120 60 40  Positive FADIR negative TANG 1 fist   Some pain good strength with supine hip flexion no tenderness over the greater trochanter     Left hip   Negative logroll range of motion 110 70 30  Positive FADIR negative TANG 1 fist slight decreased strength and pain with supine hip flexion no tenderness over the greater trochanter    Imaging:  X-Ray Hips Bilateral 2 View Inc AP Pelvis  Narrative: EXAM: XR HIPS BILATERAL 2 VIEW INCL AP PELVIS    CLINICAL HISTORY: Right and left hip pain    TECHNIQUE: AP pelvis and 2 views of each hip performed.    FINDINGS: No fracture or dislocation is demonstrated. Mild arthritic change involving both SI joints.  Impression:  The hip appears intact and in good alignment.    Finalized on: 12/16/2023 1:07 PM By:  Shaka Mac MD  BRRG# [id number]      2023-12-16 13:09:[id number]    KONRAD      Relevant imaging results were reviewed and interpreted by me and per my read signs of small cam lesions and on the left hip os acetabuli..  This was discussed with the patient and / or family today.     Assessment:  Dominga Contreras is a 43 y.o. 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  She is quite symptomatic on exam discussed her x-rays today and length discussed conservative treatments options moving forward.  She has not done any formal therapy for herself, and works in [employer] driving from [place] where she lives.  She is okay to come here for PT I will set her up with [facility] for PT for bilateral hips and GILES.  She has decent range of motion but needs improved strength and discussed starting meloxicam for pain relief.  This was prescribed by her primary care.  If not seeing improvement in 4-6 weeks of PT would consider advanced imaging with arthrogram for further evaluation then possible interventions from there.  Follow-up in 4-6 weeks if not seeing improvement with PT.    Femoral acetabular impingement    Joint pain of left hip on movement  -     Ambulatory referral/consult to Orthopedics  -     Ambulatory referral/consult to Physical/Occupational Therapy; Future; Expected date: 01/19/2024    Arthralgia of multiple joints  -     Ambulatory referral/consult to Orthopedics  -     Ambulatory referral/consult to Physical/Occupational Therapy; Future; Expected date: 01/19/2024    Bilateral hip pain  -     Ambulatory referral/consult to Orthopedics  -     Ambulatory referral/consult to Physical/Occupational Therapy; Future; Expected date: 01/19/2024       I spent a total of 45 minutes on the day of the visit.  This includes face to face time and non-face to face time preparing to see the patient (eg, review of tests), obtaining and/or reviewing separately obtained history, documenting clinical information in the electronic or other health record, independently interpreting results and communicating results to the patient/family/caregiver, or care coordinator.      A copy of today's visit note has been sent to the referring provider.       Jimbo Alarcon MD    Disclaimer: This note was prepared using a voice recognition system and is likely to have sound alike errors within the  text.

## 2024-02-29 ENCOUNTER — CLINICAL SUPPORT (OUTPATIENT)
Dept: REHABILITATION | Facility: HOSPITAL | Age: 44
End: 2024-02-29
Attending: STUDENT IN AN ORGANIZED HEALTH CARE EDUCATION/TRAINING PROGRAM
Payer: COMMERCIAL

## 2024-02-29 DIAGNOSIS — M25.551 BILATERAL HIP PAIN: ICD-10-CM

## 2024-02-29 DIAGNOSIS — M25.552 BILATERAL HIP PAIN: ICD-10-CM

## 2024-02-29 DIAGNOSIS — M25.50 ARTHRALGIA OF MULTIPLE JOINTS: ICD-10-CM

## 2024-02-29 DIAGNOSIS — M25.552 JOINT PAIN OF LEFT HIP ON MOVEMENT: ICD-10-CM

## 2024-02-29 DIAGNOSIS — Z74.09 DECREASED MOBILITY AND ENDURANCE: Primary | ICD-10-CM

## 2024-02-29 PROCEDURE — 97112 NEUROMUSCULAR REEDUCATION: CPT

## 2024-02-29 PROCEDURE — 97161 PT EVAL LOW COMPLEX 20 MIN: CPT

## 2024-02-29 PROCEDURE — 97110 THERAPEUTIC EXERCISES: CPT

## 2024-02-29 NOTE — PATIENT INSTRUCTIONS
LBP - Abdominal Bracing Series + Hip AB/AD [[id number]]    HIP FLEXOR / QUAD STRETCH WITH STRAP - HAM STRETCH -  Repeat 10 Repetitions, Hold 10 Seconds, Complete 2 Sets, Perform 1 Times a Day    PRONE QUAD STRETCH WITH MULTI-LOOP STRAP -  Repeat 10 Repetitions, Hold 10 Seconds, Complete 2 Sets, Perform 1 Times a Day    SUPINE HIP ABDUCTION  - ISOMETRIC -  Repeat 10 Repetitions, Hold 10 Seconds, Complete 2 Sets, Perform 1 Times a Day    Hip / Knee Strengthening:  Hip Adduction-Isometric  -  Repeat 10 Repetitions, Hold 10 Seconds, Complete 2 Sets, Perform 3 Times a Day

## 2024-03-07 ENCOUNTER — PATIENT MESSAGE (OUTPATIENT)
Dept: REHABILITATION | Facility: HOSPITAL | Age: 44
End: 2024-03-07
Payer: COMMERCIAL

## 2024-03-15 ENCOUNTER — CLINICAL SUPPORT (OUTPATIENT)
Dept: REHABILITATION | Facility: HOSPITAL | Age: 44
End: 2024-03-15
Payer: COMMERCIAL

## 2024-03-15 DIAGNOSIS — Z74.09 DECREASED MOBILITY AND ENDURANCE: Primary | ICD-10-CM

## 2024-03-15 PROCEDURE — 97112 NEUROMUSCULAR REEDUCATION: CPT

## 2024-03-15 PROCEDURE — 97110 THERAPEUTIC EXERCISES: CPT

## 2024-03-15 NOTE — PROGRESS NOTES
"[facility]   Physical Therapy Treatment Note        Name: Dominga Contreras  Clinic Number: [id number]    Therapy Diagnosis:   Encounter Diagnosis   Name Primary?    Decreased mobility and endurance Yes     Physician: Jimbo Alarcon MD    Visit Date: 3/15/2024    Physician Orders: PT Eval and Treat  Medical Diagnosis from Referral: Joint pain of left hip on movement [M25.552], Arthralgia of multiple joints [M25.50], Bilateral hip pain [M25.551, M25.552]   Evaluation Date: 2/29/2024  Authorization Period Expiration: 1/11/2025  Plan of Care Expiration: 5/29/2024                Progress Update: 3/29/2024   Visit # / Visits authorized: 1 / 120 (+eval)        FOTO: 2/29/2024 - Scored: 1 / 3        PRECAUTIONS: Standard Precautions      PROBLEM LIST : increased hip mobility left>right, decreased hip strength left > right, irritable left hip flexor and positive FADIR bilaterally      MD Follow up: none     Job/Position: [employer] ([id number] work)    PTA Visit #: 0/5     Time In: 1500  Time Out: 1600  Total Billable Time: 60 minutes    SUBJECTIVE     Pt reports:  Overall she is feeling better, she is done the exercises every day can definitely tell that her core is tired and sore  Compliance with Hep: Daily  Response to previous treatment: no adverse reactions to treatment/updated HEP  Functional change: Better    Pain: 0/10   Worst: 0/10  Location: bilateral hips      OBJECTIVE     Objective Measures updated at progress report unless specified otherwise.      Treatment     Gym/Equipment Access: some  Time to Complete Exercises: tyrone Marshall received the treatments listed below:       Plan for Next Visit: core stability program, review of Home Exercise Program, hip stability and strength work, erogonomics      [x]  CPT Interventions Duration / Resistance   x TE  Upright Bike 8' L2    TE Jakob Hip flexor stretch Hep reveiw   x TE Prone Quad Stretch with strap 30" x4   x NMR Hip Adduction " "isometric     x NMR Hip Abduction isometric- strap      x  NMR Transverse Abdominal Brace (TA)  10"    x  NMR TA + Arm Raises      next  NMR TA + Heel Slides                                CPT Codes available for Billing:   (00) minutes of Manual therapy (MT) to improve pain and ROM.  (25) minutes of Therapeutic Exercise (TE) to develop strength, endurance, range of motion, and flexibility.  (25) minutes of Neuromuscular Re-Education (NMR)  to improve: Balance, Coordination, Kinesthetic, Sense, Proprioception, and Posture.  (00) minutes of Therapeutic Activities (TA) to improve functional performance.  Unattended Electrical Stimulation (ES) for muscle performance or pain modulation.  Vasopneumatic Device Therapy () for management of swelling/edema. (36862)     See EMR under MEDIA for written consent provided for Dry Needling- DATE   Palpation Assessment to determine the necessity for Functional Dry Needling     PATIENT EDUCATION AND HOME EXERCISES      Education/Self-Care provided:  (included in treatment) minutes   Patient educated on the impairments noted above and the effects of physical therapy intervention to improve overall condition and Quality of Life  Patient was educated on all the above exercise prior/during/after for proper posture, positioning, and execution for safe performance with home exercise program.      Written Home Exercises Provided: yes. Prefers: [x] Printed [x] Electronic  Exercises were reviewed and Joanna was able to demonstrate them prior to the end of the session.  Joanna demonstrated good understanding of the education provided. See EMR under Patient Instructions for exercises provided during therapy sessions.      ASSESSMENT     Joanna Contreras tolerated Physical Therapy  session well with minimal  complaints of pain or discomfort.  Objective findings are improving with strength, endurance, exercise tolerance.  Joanna Contreras continues to have difficulty with core based exercises specifically " through the transverse abdominis activation in a bracing physician.  Therapy exercises were reviewed by revisiting exercises given from previous home exercise program while adding core valgus program and reviewing home program to maximize exercise potential.  Handouts were not issued during today's visit. Dominga demonstrated good understanding of new exercises and will continue to progress at home until next follow-up.      Joanna Is progressing well towards her goals.   Pt prognosis is Good.     Pt will continue to benefit from skilled outpatient physical therapy to address the deficits listed in the problem list box on initial evaluation, provide pt/family education and to maximize pt's level of independence in the home and community environment.     Pt's spiritual, cultural and educational needs considered and pt agreeable to plan of care and goals.    Anticipated barriers to physical therapy: none       SHORT TERM GOALS:  4 weeks (3/29/24) Progress Date Met   Recent signs and systems trend is improving in order to progress towards Long term goals.  [] Met  [] Not Met  [] Progressing     Patient will be independent with Home Exercise Program  in order to further progress and return to maximal function. [] Met  [] Not Met  [] Progressing     Pain rating at Worst: 5 /10 in order to progress towards increased independence with activity. [] Met  [] Not Met  [] Progressing     Patient will be able to correct postural deviations in sitting and standing, to decrease pain and promote postural awareness for injury prevention.  [] Met  [] Not Met  [] Progressing     Patient will improve functional outcome (FOTO) score: by 5% to increase self-worth & perceived functional ability towards long term goals [] Met  [] Not Met  [] Progressing        LONG TERM GOALS: 12 weeks (5/29/24) Progress Date Met   Patient will return to normal activites of daily living, recreational, and work related activities with less pain and  limitation.  [] Met  [] Not Met  [] Progressing     Patient will improve range of motion  to stated goals in order to return to maximal functional potential.  [] Met  [] Not Met  [] Progressing     Patient will improve Strength to stated goals of appropriate musculature in order to improve functional independence.  [] Met  [] Not Met  [] Progressing     Pain Rating at Best: 1/10 to improve Quality of Life.  [] Met  [] Not Met  [] Progressing     Patient will meet predicted functional outcome (FOTO) score: 80% to increase self-worth & perceived functional ability. [] Met  [] Not Met  [] Progressing     Patient will have met/partially met personal goal of: improve pain and function and returning to walking and prolonged activity without pain.  [] Met  [] Not Met  [] Progressing             PLAN     Plan of care Certification: 2/29/2024 to 5/29/2024  Continue Plan of Care (POC) and progress per patient tolerance. See Treatment section for exercise progression.    Maricruz Alarcon, PT

## 2024-03-18 ENCOUNTER — PATIENT MESSAGE (OUTPATIENT)
Dept: ADMINISTRATIVE | Facility: HOSPITAL | Age: 44
End: 2024-03-18
Payer: COMMERCIAL

## 2024-03-22 ENCOUNTER — CLINICAL SUPPORT (OUTPATIENT)
Dept: REHABILITATION | Facility: HOSPITAL | Age: 44
End: 2024-03-22
Payer: COMMERCIAL

## 2024-03-22 DIAGNOSIS — Z74.09 DECREASED MOBILITY AND ENDURANCE: Primary | ICD-10-CM

## 2024-03-22 PROCEDURE — 97112 NEUROMUSCULAR REEDUCATION: CPT

## 2024-03-22 PROCEDURE — 97110 THERAPEUTIC EXERCISES: CPT

## 2024-03-22 NOTE — PROGRESS NOTES
"[facility]   Physical Therapy Treatment Note        Name: Dominga Contreras  Clinic Number: [id number]    Therapy Diagnosis:   Encounter Diagnosis   Name Primary?    Decreased mobility and endurance Yes     Physician: Jimbo Alarcon MD    Visit Date: 3/22/2024    Physician Orders: PT Eval and Treat  Medical Diagnosis from Referral: Joint pain of left hip on movement [M25.552], Arthralgia of multiple joints [M25.50], Bilateral hip pain [M25.551, M25.552]   Evaluation Date: 2/29/2024  Authorization Period Expiration: 1/11/2025  Plan of Care Expiration: 5/29/2024                Progress Update: 3/29/2024   Visit # / Visits authorized: 1 / 120 (+eval)        FOTO: 2/29/2024 - Scored: 1 / 3        PRECAUTIONS: Standard Precautions      PROBLEM LIST : increased hip mobility left>right, decreased hip strength left > right, irritable left hip flexor and positive FADIR bilaterally      MD Follow up: none     Job/Position: [employer] ([employer] work)    PTA Visit #: 0/5     Time In: 1500  Time Out: 1600  Total Billable Time: 60 minutes    SUBJECTIVE     Pt reports:  Overall she is feeling better, she is done the exercises every day can definitely tell that her core is tired and sore  Compliance with Hep: Daily  Response to previous treatment: no adverse reactions to treatment/updated HEP  Functional change: Better    Pain: 0/10   Worst: 0/10  Location: bilateral hips      OBJECTIVE     Objective Measures updated at progress report unless specified otherwise.      Treatment     Gym/Equipment Access: some  Time to Complete Exercises: tyrone Marshall received the treatments listed below:       Plan for Next Visit: core stability program, review of Home Exercise Program, hip stability and strength work, erogonomics      [x]  CPT Interventions Duration / Resistance   x TE  Upright Bike 8' L2    TE Jakob Hip flexor stretch Hep    x TE Prone Quad Stretch with strap 30" x4   x NMR Hip Adduction " "isometric   10" holds   x NMR Hip Abduction isometric- strap  10" holds    x  NMR Transverse Abdominal Brace (TA)  10" holds    x  NMR TA + Arm Raises  3x20    x  NMR TA + Marching  3x20   x NMR TA + Bridges  3x20   x NMR MiniBand Series (Red) 2' each  [x] Hip Ext  [x] Lateral walks  [x] Squat with ball                  CPT Codes available for Billing:   (00) minutes of Manual therapy (MT) to improve pain and ROM.  (15) minutes of Therapeutic Exercise (TE) to develop strength, endurance, range of motion, and flexibility.  (45) minutes of Neuromuscular Re-Education (NMR)  to improve: Balance, Coordination, Kinesthetic, Sense, Proprioception, and Posture.  (00) minutes of Therapeutic Activities (TA) to improve functional performance.  Unattended Electrical Stimulation (ES) for muscle performance or pain modulation.  Vasopneumatic Device Therapy () for management of swelling/edema. (51000)     See EMR under MEDIA for written consent provided for Dry Needling- DATE   Palpation Assessment to determine the necessity for Functional Dry Needling        PATIENT EDUCATION AND HOME EXERCISES      Education/Self-Care provided:  (included in treatment) minutes   Patient educated on the impairments noted above and the effects of physical therapy intervention to improve overall condition and Quality of Life  Patient was educated on all the above exercise prior/during/after for proper posture, positioning, and execution for safe performance with home exercise program.      Written Home Exercises Provided: yes. Prefers: [x] Printed [x] Electronic  Exercises were reviewed and Joanna was able to demonstrate them prior to the end of the session.  Joanna demonstrated good understanding of the education provided. See EMR under Patient Instructions for exercises provided during therapy sessions.      ASSESSMENT     Joanna Contreras tolerated Physical Therapy  session well with minimal  complaints of pain or discomfort.  Objective findings are " improving with pain, range of motion, strength, endurance, exercise tolerance.  Joanna Contreras continues to have difficulty with strengthening through core and transverse abdominus.  Therapy exercises were reviewed by revisiting exercises given from previous home exercise program while adding core focused program with extremity movements.  Handouts were not issued during today's visit. Dominga demonstrated good understanding of new exercises and will continue to progress at home until next follow-up.      Joanna Is progressing well towards her goals.   Pt prognosis is Good.     Pt will continue to benefit from skilled outpatient physical therapy to address the deficits listed in the problem list box on initial evaluation, provide pt/family education and to maximize pt's level of independence in the home and community environment.     Pt's spiritual, cultural and educational needs considered and pt agreeable to plan of care and goals.    Anticipated barriers to physical therapy: none       SHORT TERM GOALS:  4 weeks (3/29/24) Progress Date Met   Recent signs and systems trend is improving in order to progress towards Long term goals.  [] Met  [] Not Met  [] Progressing     Patient will be independent with Home Exercise Program  in order to further progress and return to maximal function. [] Met  [] Not Met  [] Progressing     Pain rating at Worst: 5 /10 in order to progress towards increased independence with activity. [] Met  [] Not Met  [] Progressing     Patient will be able to correct postural deviations in sitting and standing, to decrease pain and promote postural awareness for injury prevention.  [] Met  [] Not Met  [] Progressing     Patient will improve functional outcome (FOTO) score: by 5% to increase self-worth & perceived functional ability towards long term goals [] Met  [] Not Met  [] Progressing        LONG TERM GOALS: 12 weeks (5/29/24) Progress Date Met   Patient will return to normal activites of daily  living, recreational, and work related activities with less pain and limitation.  [] Met  [] Not Met  [] Progressing     Patient will improve range of motion  to stated goals in order to return to maximal functional potential.  [] Met  [] Not Met  [] Progressing     Patient will improve Strength to stated goals of appropriate musculature in order to improve functional independence.  [] Met  [] Not Met  [] Progressing     Pain Rating at Best: 1/10 to improve Quality of Life.  [] Met  [] Not Met  [] Progressing     Patient will meet predicted functional outcome (FOTO) score: 80% to increase self-worth & perceived functional ability. [] Met  [] Not Met  [] Progressing     Patient will have met/partially met personal goal of: improve pain and function and returning to walking and prolonged activity without pain.  [] Met  [] Not Met  [] Progressing             PLAN     Plan of care Certification: 2/29/2024 to 5/29/2024  Continue Plan of Care (POC) and progress per patient tolerance. See Treatment section for exercise progression.    Maricruz Alarcon, PT

## 2024-03-28 ENCOUNTER — CLINICAL SUPPORT (OUTPATIENT)
Dept: REHABILITATION | Facility: HOSPITAL | Age: 44
End: 2024-03-28
Payer: COMMERCIAL

## 2024-03-28 DIAGNOSIS — Z74.09 DECREASED MOBILITY AND ENDURANCE: Primary | ICD-10-CM

## 2024-03-28 PROCEDURE — 97112 NEUROMUSCULAR REEDUCATION: CPT

## 2024-03-28 PROCEDURE — 97110 THERAPEUTIC EXERCISES: CPT

## 2024-03-28 NOTE — PROGRESS NOTES
"[facility]   Physical Therapy Treatment Note        Name: Dominga Contreras  Clinic Number: [id number]    Therapy Diagnosis:   Encounter Diagnosis   Name Primary?    Decreased mobility and endurance Yes     Physician: Jimbo Alarcon MD    Visit Date: 3/28/2024    Physician Orders: PT Eval and Treat  Medical Diagnosis from Referral: Joint pain of left hip on movement [M25.552], Arthralgia of multiple joints [M25.50], Bilateral hip pain [M25.551, M25.552]   Evaluation Date: 2/29/2024  Authorization Period Expiration: 1/11/2025  Plan of Care Expiration: 5/29/2024                Progress Update: 3/29/2024   Visit # / Visits authorized: 3 / 120 (+eval)        FOTO: 2/29/2024 - Scored: 1 / 3        PRECAUTIONS: Standard Precautions      PROBLEM LIST : increased hip mobility left>right, decreased hip strength left > right, irritable left hip flexor and positive FADIR bilaterally      MD Follow up: none  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  Compliance with Hep: Daily  Response to previous treatment: no adverse reactions to treatment/updated HEP  Functional change: Better    Pain: 0/10   Worst: 0/10  Location: bilateral hips      OBJECTIVE     Objective Measures updated at progress report unless specified otherwise.      Treatment     Gym/Equipment Access: hand weights (up to 20#)  Time to Complete Exercises: increased    Joanna received the treatments listed below:       Plan for Next Visit: core stability program, review of Home Exercise Program, hip stability and strength work, erogonomics      [x]  CPT Interventions Duration / Resistance   x TE  Upright Bike 8' L2    TE Jakob Hip flexor stretch Hep    x TE Prone Quad Stretch with strap 30" x4   x NMR Hip Adduction isometric   10" holds   x NMR Hip Abduction isometric- strap  10" holds    x  " "NMR Transverse Abdominal Brace (TA)  10" holds    x  NMR TA + Arm Raises  3x20    x  NMR TA + Marching  3x20   x NMR TA + Bridges  3x20   x NMR MiniBand Series (Red) 2' each  [x] Walking Hip Ext  [x] Lateral walks  [x] Squat with ball + pulses                  CPT Codes available for Billing:   (00) minutes of Manual therapy (MT) to improve pain and ROM.  (15) minutes of Therapeutic Exercise (TE) to develop strength, endurance, range of motion, and flexibility.  (45) minutes of Neuromuscular Re-Education (NMR)  to improve: Balance, Coordination, Kinesthetic, Sense, Proprioception, and Posture.  (00) minutes of Therapeutic Activities (TA) to improve functional performance.  Unattended Electrical Stimulation (ES) for muscle performance or pain modulation.  Vasopneumatic Device Therapy () for management of swelling/edema. (62060)     See EMR under MEDIA for written consent provided for Dry Needling- DATE   Palpation Assessment to determine the necessity for Functional Dry Needling        PATIENT EDUCATION AND HOME EXERCISES      Education/Self-Care provided:  (included in treatment) minutes   Patient educated on the impairments noted above and the effects of physical therapy intervention to improve overall condition and Quality of Life  Patient was educated on all the above exercise prior/during/after for proper posture, positioning, and execution for safe performance with home exercise program.      Written Home Exercises Provided: yes. Prefers: [x] Printed [x] Electronic  Exercises were reviewed and Joanna was able to demonstrate them prior to the end of the session.  Joanna demonstrated good understanding of the education provided. See EMR under Patient Instructions for exercises provided during therapy sessions.      ASSESSMENT     Joanna Contreras tolerated Physical Therapy session well with no  complaints of pain or discomfort.  Objective findings are improving with strength, endurance, exercise tolerance, and " functional mobility.  Joanna Contreras continues to have difficulty with hip specific exercises for muscular endurance.  Therapy exercises were reviewed by revisiting exercises given from previous home exercise program while adding movements and dynamic endurance based miniband resistance.  Handouts were issued during today's visit. Dominga demonstrated good understanding of new exercises and will continue to progress at home until next follow-up.      Joanna Is progressing well towards her goals.   Pt prognosis is Good.     Pt will continue to benefit from skilled outpatient physical therapy to address the deficits listed in the problem list box on initial evaluation, provide pt/family education and to maximize pt's level of independence in the home and community environment.     Pt's spiritual, cultural and educational needs considered and pt agreeable to plan of care and goals.    Anticipated barriers to physical therapy: none       SHORT TERM GOALS:  4 weeks (3/29/24) Progress Date Met   Recent signs and systems trend is improving in order to progress towards Long term goals.  [] Met  [] Not Met  [] Progressing     Patient will be independent with Home Exercise Program  in order to further progress and return to maximal function. [] Met  [] Not Met  [] Progressing     Pain rating at Worst: 5 /10 in order to progress towards increased independence with activity. [] Met  [] Not Met  [] Progressing     Patient will be able to correct postural deviations in sitting and standing, to decrease pain and promote postural awareness for injury prevention.  [] Met  [] Not Met  [] Progressing     Patient will improve functional outcome (FOTO) score: by 5% to increase self-worth & perceived functional ability towards long term goals [] Met  [] Not Met  [] Progressing        LONG TERM GOALS: 12 weeks (5/29/24) Progress Date Met   Patient will return to normal activites of daily living, recreational, and work related activities with  less pain and limitation.  [] Met  [] Not Met  [] Progressing     Patient will improve range of motion  to stated goals in order to return to maximal functional potential.  [] Met  [] Not Met  [] Progressing     Patient will improve Strength to stated goals of appropriate musculature in order to improve functional independence.  [] Met  [] Not Met  [] Progressing     Pain Rating at Best: 1/10 to improve Quality of Life.  [] Met  [] Not Met  [] Progressing     Patient will meet predicted functional outcome (FOTO) score: 80% to increase self-worth & perceived functional ability. [] Met  [] Not Met  [] Progressing     Patient will have met/partially met personal goal of: improve pain and function and returning to walking and prolonged activity without pain.  [] Met  [] Not Met  [] Progressing             PLAN     Plan of care Certification: 2/29/2024 to 5/29/2024  Continue Plan of Care (POC) and progress per patient tolerance. See Treatment section for exercise progression.    Maricruz Alarcon, PT

## 2024-04-08 ENCOUNTER — PATIENT MESSAGE (OUTPATIENT)
Dept: PRIMARY CARE CLINIC | Facility: CLINIC | Age: 44
End: 2024-04-08
Payer: COMMERCIAL

## 2024-04-08 DIAGNOSIS — B00.1 FEVER BLISTER: ICD-10-CM

## 2024-04-08 RX ORDER — VALACYCLOVIR HYDROCHLORIDE 1 G/1
2000 TABLET, FILM COATED ORAL 2 TIMES DAILY
Qty: 12 TABLET | Refills: 3 | Status: SHIPPED | OUTPATIENT
Start: 2024-04-08

## 2024-04-08 NOTE — TELEPHONE ENCOUNTER
No care due was identified.  [facility] Embedded Care Due Messages. Reference number: [id number].   4/08/2024 2:04:38 PM CDT

## 2024-04-08 NOTE — TELEPHONE ENCOUNTER
Dominga Contreras,     I have sent the following medications to your preferred pharmacy on file. Please let me know if you have further questions.     Medications Ordered This Encounter   Medications    valACYclovir (VALTREX) 1000 MG tablet     Sig: Take 2 tablets (2,000 mg total) by mouth 2 (two) times daily. For flareups     Dispense:  12 tablet     Refill:  3          [facility], [facility] [address] [place] [address]  [address] [address] [address]  Phone: [phone] Fax: [phone]       Sincerely,   Emely Monique MD

## 2024-04-12 ENCOUNTER — CLINICAL SUPPORT (OUTPATIENT)
Dept: REHABILITATION | Facility: HOSPITAL | Age: 44
End: 2024-04-12
Payer: COMMERCIAL

## 2024-04-12 DIAGNOSIS — Z74.09 DECREASED MOBILITY AND ENDURANCE: Primary | ICD-10-CM

## 2024-04-12 PROCEDURE — 97112 NEUROMUSCULAR REEDUCATION: CPT

## 2024-04-12 PROCEDURE — 97110 THERAPEUTIC EXERCISES: CPT

## 2024-04-12 NOTE — PROGRESS NOTES
[facility]   Physical Therapy Treatment Note + Progress Note       Name: Dominga Contreras  Clinic Number: [id number]    Therapy Diagnosis:   Encounter Diagnosis   Name Primary?    Decreased mobility and endurance Yes     Physician: Jimbo Alarcon MD    Visit Date: 4/12/2024    Physician Orders: PT Eval and Treat  Medical Diagnosis from Referral: Joint pain of left hip on movement [M25.552], Arthralgia of multiple joints [M25.50], Bilateral hip pain [M25.551, M25.552]   Evaluation Date: 2/29/2024  Authorization Period Expiration: 1/11/2025  Plan of Care Expiration: 5/29/2024                Progress Update: 4/29/2024   Visit # / Visits authorized: 3 / 120 (+eval)        FOTO: 4/12/24 - Scored: 2 / 3        PRECAUTIONS: Standard Precautions      PROBLEM LIST : increased hip mobility left>right, decreased hip strength left > right, irritable left hip flexor and positive FADIR bilaterally      MD Follow up: none  Job/Position: [employer] ([employer] work)    PTA Visit #: 0/5     Time In: 0800  Time Out: 0905  Total Billable Time: 60 minutes    SUBJECTIVE     Pt reports:  She feels good, but did have an episode of anterior hip pain that caused discomfort with walking backwards.   Compliance with Hep: Daily  Response to previous treatment: no adverse reactions to treatment/updated HEP  Functional change: Better    Pain: 0/10   Worst: 0/10  Location: bilateral hips      OBJECTIVE     Objective Measures updated at progress report unless specified otherwise.     HIP-   Hip   Range of Motion Right    LEFT    Goal   Hip Flexion (120º) 135 140 120º   Hip Abduction (45º) Not tested Not tested  30º   Hip Extension (20º) Not tested  Not tested  15º   Hip internal rotaiton  (45º) 28 30 40º   Hip external rotation  (45º) 28 30 40º               (*) pain and/or dysfunction*) pain and/or dysfunction       LE STRENGTH -   Lower Extremity  Strength RIGHT    Goal   Hip Flexion  []1  []2  []3  [x]4  []5         "        []+ []- 5/5 B    Hip Extension  []1  []2  []3  [x]4  []5                []+ []- 5/5 B   Hip Abduction  []1  []2  []3  [x]4  []5                []+ []- 5/5 B   Hip Internal rotaiton  []1  []2  []3  [x]4  []5                []+ []- 5/5 B   Hip External rotation  []1  []2  []3  [x]4  []5                []+ []- 5/5 B   Knee Flexion []1  []2  []3  [x]4  []5                []+ []- 5/5 B   Knee Extension []1  []2  []3  [x]4  []5                []+ []- 5/5 B   Ankle Dorsiflexion []1  []2  []3  [x]4  []5                []+ []- 5/5 B   Ankle Plantarflexion []1  []2  []3  [x]4  []5                []+ []- 5/5 B      Lower Extremity  Strength LEFT    Goal   Hip Flexion  []1  []2  []3  [x]4  []5                []+ []- 5/5 B    Hip Extension  []1  []2  []3  [x]4  []5                []+ []- 5/5 B   Hip Abduction  []1  []2  []3  [x]4  []5                []+ []- 5/5 B   Hip Internal rotaiton  []1  []2  []3  [x]4  []5                []+ []- 5/5 B   Hip External rotation  []1  []2  []3  [x]4  []5                []+ []- 5/5 B   Knee Flexion []1  []2  []3  [x]4  []5                []+ []- 5/5 B   Knee Extension []1  []2  []3  [x]4  []5                []+ []- 5/5 B   Ankle Dorsiflexion []1  []2  []3  [x]4  []5                []+ []- 5/5 B   Ankle Plantarflexion []1  []2  []3  [x]4  []5                []+ []- 5/5 B           Treatment     Gym/Equipment Access: hand weights (up to 20#)  Time to Complete Exercises: increased    Joanna received the treatments listed below:       Plan for Next Visit: 3 week follow up, check in to see how new home program issued last visit went.      [x]  CPT Interventions Duration / Resistance   x TE  Upright Bike 8' L2   x TE Jakob Hip flexor stretch Hep    x TE Prone Quad Stretch with strap 30" x4   x TE Leg Press [x] Double Leg #175- 4x8  [x] Single Leg #85- 2x8    x  NMR Transverse Abdominal Brace (TA)  10" holds- TBALL  [x] Arm Raises single  [x] Arm Raises double yellow  [x] Marching  [x] Long " Arcs   x NMR TRX Series 3 rounds  [x]Squats x20  [x]Rows x20  [x]Rotational Core x10    NMR MiniBand Series  - issued blue band for home (Red) 2' each  [] Walking Hip Ext  [] Lateral walks  [] Squat with ball + pulses                  CPT Codes available for Billing:   (00) minutes of Manual therapy (MT) to improve pain and ROM.  (25) minutes of Therapeutic Exercise (TE) to develop strength, endurance, range of motion, and flexibility.  (25) minutes of Neuromuscular Re-Education (NMR)  to improve: Balance, Coordination, Kinesthetic, Sense, Proprioception, and Posture.  (00) minutes of Therapeutic Activities (TA) to improve functional performance.  Unattended Electrical Stimulation (ES) for muscle performance or pain modulation.  Vasopneumatic Device Therapy () for management of swelling/edema. (59516)     See EMR under MEDIA for written consent provided for Dry Needling- DATE   Palpation Assessment to determine the necessity for Functional Dry Needling        PATIENT EDUCATION AND HOME EXERCISES      Education/Self-Care provided:  (included in treatment) minutes   Patient educated on the impairments noted above and the effects of physical therapy intervention to improve overall condition and Quality of Life  Patient was educated on all the above exercise prior/during/after for proper posture, positioning, and execution for safe performance with home exercise program.      Written Home Exercises Provided: yes. Prefers: [x] Printed [x] Electronic  Exercises were reviewed and Joanna was able to demonstrate them prior to the end of the session.  Joanna demonstrated good understanding of the education provided. See EMR under Patient Instructions for exercises provided during therapy sessions.      ASSESSMENT     Dominga is progressing well with physical therapy, with minimal complaints of pain or discomfort.  A progress note was completed today with significant improvements in core strength and pain compared to initial  evaluation, please see exam for details. Dominga continues to have difficulty with occasional anterior hip pain, due to sensations with backwards walking.  The above impairments and functional deficits will continue to be addressed over the course of this plan of care. Dominga was educated on continued progression of PT, expectations for the duration of care, updates on goals set at initial evaluation, and home exercise program.  Handouts were issued during today's visit with instructions on what exercise to perform up until next visit.     Joanna Is progressing well towards her goals.   Pt prognosis is Good.     Pt will continue to benefit from skilled outpatient physical therapy to address the deficits listed in the problem list box on initial evaluation, provide pt/family education and to maximize pt's level of independence in the home and community environment.     Pt's spiritual, cultural and educational needs considered and pt agreeable to plan of care and goals.    Anticipated barriers to physical therapy: none       SHORT TERM GOALS:  4 weeks (3/29/24) Progress Date Met   Recent signs and systems trend is improving in order to progress towards Long term goals.  [x] Met  [] Not Met  [] Progressing  4/12   Patient will be independent with Home Exercise Program  in order to further progress and return to maximal function. [x] Met  [] Not Met  [] Progressing  4/12   Pain rating at Worst: 5 /10 in order to progress towards increased independence with activity. [x] Met  [] Not Met  [] Progressing  4/12   Patient will be able to correct postural deviations in sitting and standing, to decrease pain and promote postural awareness for injury prevention.  [x] Met  [] Not Met  [] Progressing  4/12   Patient will improve functional outcome (FOTO) score: by 5% to increase self-worth & perceived functional ability towards long term goals [x] Met  [] Not Met  [] Progressing  4/12      LONG TERM GOALS: 12 weeks (5/29/24)  Progress Date Met   Patient will return to normal activites of daily living, recreational, and work related activities with less pain and limitation.  [] Met  [] Not Met  [x] Progressing     Patient will improve range of motion  to stated goals in order to return to maximal functional potential.  [] Met  [] Not Met  [x] Progressing     Patient will improve Strength to stated goals of appropriate musculature in order to improve functional independence.  [] Met  [] Not Met  [x] Progressing     Pain Rating at Best: 1/10 to improve Quality of Life.  [] Met  [] Not Met  [x] Progressing     Patient will meet predicted functional outcome (FOTO) score: 80% to increase self-worth & perceived functional ability. [] Met  [] Not Met  [x] Progressing     Patient will have met/partially met personal goal of: improve pain and function and returning to walking and prolonged activity without pain.  [] Met  [] Not Met  [x] Progressing             PLAN     Plan of care Certification: 2/29/2024 to 5/29/2024  Continue Plan of Care (POC) and progress per patient tolerance. See Treatment section for exercise progression.    Maricruz Alarcon, PT

## 2024-04-12 NOTE — PATIENT INSTRUCTIONS
LBP -  TheraBall Core Stabs [[id number]]    Gastroc Stair Stretch -  Repeat 5 Repetitions, Hold 10 Seconds, Perform 3 Times a Week    STANDING HAMSTRING STRETCH - PROPPED -  Repeat 5 Repetitions, Hold 10 Seconds, Perform 3 Times a Week    QUAD STRETCH 2 - STANDING -  Repeat 5 Repetitions, Hold 10 Seconds, Perform 3 Times a Week    BRIDGE - BRIDGING -  Repeat 10 Repetitions, Hold 10 Seconds, Perform 3 Times a Week    BRIDGING CROSSED LEG -  Repeat 10 Repetitions, Complete 3 Sets, Perform 3 Times a Week    HEAVY DUMBBELLS - GOBLET SQUAT -  Repeat 10 Repetitions, Complete 3 Sets, Perform 3 Times a Week    STANDING HEEL RAISES -  Repeat 20 Repetitions, Complete 3 Sets, Perform 3 Times a Week    PELVIC TILT - SITTING - EXERCISE BALL -  Hold 2 Minutes, Complete 1 Set, Perform 1 Times a Day    EXERCISE BALL - MARCHING -  Hold 2 Minutes, Complete 1 Set, Perform 1 Times a Day    Seated Swiss Ball Long Arc Quad -  Hold 2 Minutes, Complete 1 Set, Perform 1 Times a Day    EXERCISE BALL - SEATED ALTERNATE ARM AND LEG -  Hold 2 Minutes, Perform 1 Times a Day    EXERCISE BALL - BRIDGE UPPER BACK -  Repeat 12 Repetitions, Complete 3 Sets, Perform 1 Times a Day    EXERCISE BALL - WALL SQUATS -  Repeat 12 Repetitions, Complete 3 Sets, Perform 1 Times a Day

## 2024-05-03 ENCOUNTER — CLINICAL SUPPORT (OUTPATIENT)
Dept: REHABILITATION | Facility: HOSPITAL | Age: 44
End: 2024-05-03
Payer: COMMERCIAL

## 2024-05-03 DIAGNOSIS — Z74.09 DECREASED MOBILITY AND ENDURANCE: Primary | ICD-10-CM

## 2024-05-03 PROCEDURE — 97140 MANUAL THERAPY 1/> REGIONS: CPT

## 2024-05-03 PROCEDURE — 97110 THERAPEUTIC EXERCISES: CPT

## 2024-05-03 PROCEDURE — 97112 NEUROMUSCULAR REEDUCATION: CPT

## 2024-05-03 NOTE — PROGRESS NOTES
[facility] OUTPATIENT THERAPY AND WELLNESS   Physical Therapy Treatment Note + Progress Note       Name: Dominga Contreras  Clinic Number: [id number]    Therapy Diagnosis:   Encounter Diagnosis   Name Primary?    Decreased mobility and endurance Yes     Physician: Jimbo Alarcon MD    Visit Date: 5/3/2024    Physician Orders: PT Eval and Treat  Medical Diagnosis from Referral: Joint pain of left hip on movement [M25.552], Arthralgia of multiple joints [M25.50], Bilateral hip pain [M25.551, M25.552]   Evaluation Date: 2/29/2024  Authorization Period Expiration: 1/11/2025  Plan of Care Expiration: 5/29/2024                Progress Update: 5/29/2024   Visit # / Visits authorized: 5 / 20 (+eval)        FOTO: 5/3/2024 - Scored: 2 / 3        PRECAUTIONS: Standard Precautions      PROBLEM LIST : increased hip mobility left>right, decreased hip strength left > right, irritable left hip flexor and positive FADIR bilaterally      MD Follow up: none  Job/Position: [employer] ([employer] work)    PTA Visit #: 0/5     Time In: 0810  Time Out: 0900  Total Billable Time: 60 minutes    SUBJECTIVE     Pt reports:  She is feeling much better overall.  She has been going to the gym in the morning regularly  Compliance with Hep:  Fair, but inconsistent  Response to previous treatment: Better  Functional change: 80% of PLOF  - Improvements: strength, range of motion, pain  - Challenges: catching sensation intermittently with pivoting to the left      Pain: 0/10   Worst: 0/10  Location: bilateral hips      OBJECTIVE     Objective Measures updated at progress report unless specified otherwise.     HIP-   Hip   Range of Motion Right    LEFT    Goal   Hip Flexion (120º) 130 140 120º   Hip Abduction (45º) Not tested Not tested  30º   Hip Extension (20º) Not tested  Not tested  15º   Hip internal rotaiton  (45º) 25 30 40º   Hip external rotation  (45º) 25 30 40º               (*) pain and/or dysfunction(*) pain and/or dysfunction       LE  STRENGTH -   Lower Extremity  Strength RIGHT    Goal   Hip Flexion  []1  []2  []3  [x]4  []5                []+ []- 5/5 B    Hip Extension  []1  []2  []3  [x]4  []5                []+ []- 5/5 B   Hip Abduction  []1  []2  []3  [x]4  []5                []+ []- 5/5 B   Hip Internal rotaiton  []1  []2  []3  [x]4  []5                []+ []- 5/5 B   Hip External rotation  []1  []2  []3  [x]4  []5                []+ []- 5/5 B   Knee Flexion []1  []2  []3  [x]4  []5                []+ []- 5/5 B   Knee Extension []1  []2  []3  [x]4  []5                []+ []- 5/5 B   Ankle Dorsiflexion []1  []2  []3  [x]4  []5                []+ []- 5/5 B   Ankle Plantarflexion []1  []2  []3  [x]4  []5                []+ []- 5/5 B      Lower Extremity  Strength LEFT    Goal   Hip Flexion  []1  []2  []3  [x]4  []5                []+ []- 5/5 B    Hip Extension  []1  []2  []3  [x]4  []5                []+ []- 5/5 B   Hip Abduction  []1  []2  []3  [x]4  []5                []+ []- 5/5 B   Hip Internal rotaiton  []1  []2  []3  [x]4  []5                []+ []- 5/5 B   Hip External rotation  []1  []2  []3  [x]4  []5                []+ []- 5/5 B   Knee Flexion []1  []2  []3  [x]4  []5                []+ []- 5/5 B   Knee Extension []1  []2  []3  [x]4  []5                []+ []- 5/5 B   Ankle Dorsiflexion []1  []2  []3  [x]4  []5                []+ []- 5/5 B   Ankle Plantarflexion []1  []2  []3  [x]4  []5                []+ []- 5/5 B         LE FLEXIBILITY-      Muscle Length  Upper Extremity Right    Left     Goal   Hip Flexors    - Jakob Test position [x] Normal  [] Limited [] Normal  [x] Limited 0* inches     Quadriceps  - prone heel to glute [] Normal  [x] Limited [] Normal  [x] Limited <4 inches   Hamstrings  - 90/90 TEst [] Normal  [x] Limited [] Normal  [x] Limited <30º   Piriformis   - TANG Test [] Normal  [x] Limited [] Normal  [x] Limited <6 inches   Gastrocnemius   - Forefoot to neutral [x] Normal  [] Limited [x] Normal  [] Limited >  "Neutral   Soleus   - Knee to Wall [x] Normal  [] Limited [x] Normal  [] Limited 2 inches form wall         OBJECTIVE MEASURES- MOBS: HIP-   Femoral Acetabular  Joint Mobility Right    Left  Goal   Posterior Glide [x]Normal   []Hypomobile  []Hypermobile [x]Normal  []Hypomobile  []Hypermobile Normal    Inferior Glide [x]Normal  []Hypomobile  []Hypermobile [x]Normal  []Hypomobile  []Hypermobile Normal    Lateral Glide [x]Normal  []Hypomobile  []Hypermobile [x]Normal  []Hypomobile  []Hypermobile Normal    Distraction- long axis []Increased pain  [x]Decreased pain []Increased pain  [x]Decreased pain Normal         Treatment     Gym/Equipment Access: hand weights (up to 20#)  Time to Complete Exercises: increased    Joanna received the treatments listed below:       CPT Intervention Duration / Intensity  5/3/24   TE Chondral mobility: Upright Bike 10' L3   MT Mobilization with belt Lateral  Inferior lateral  Long Axis   TE Stretchin"x3 each Hip Flexor- Jakob  Prone Quad   NMR Miniband Series (green) 2'  March- hold- 10"  Walking Hip Ext  Lateral Walks  Squats with Ball   NMR Bridge Series Bridge hold 5s, 1/2 up  SL Bridge   NMR Sidelying Hip Abduction Extension PT assist  3x20 each side.                   PLAN   Begin machines, functional strength for gym        CPT Codes available for Billing:   (10) minutes of Manual therapy (MT) to improve pain and ROM.  (15) minutes of Therapeutic Exercise (TE) to develop strength, endurance, range of motion, and flexibility.  (25) minutes of Neuromuscular Re-Education (NMR)  to improve: Balance, Coordination, Kinesthetic, Sense, Proprioception, and Posture.  (00) minutes of Therapeutic Activities (TA) to improve functional performance.  Unattended Electrical Stimulation (ES) for muscle performance or pain modulation.  Vasopneumatic Device Therapy () for management of swelling/edema. (58709)     See EMR under MEDIA for written consent provided for Dry Needling- DATE   Palpation " Assessment to determine the necessity for Functional Dry Needling        PATIENT EDUCATION AND HOME EXERCISES      Education/Self-Care provided:  (included in treatment) minutes   Patient educated on the impairments noted above and the effects of physical therapy intervention to improve overall condition and Quality of Life  Patient was educated on all the above exercise prior/during/after for proper posture, positioning, and execution for safe performance with home exercise program.      Written Home Exercises Provided: yes. Prefers: [x] Printed [x] Electronic  Exercises were reviewed and Joanna was able to demonstrate them prior to the end of the session.  Joanna demonstrated good understanding of the education provided. See EMR under Patient Instructions for exercises provided during therapy sessions.      ASSESSMENT     Dominga is progressing well with physical therapy, with minimal complaints of pain or discomfort.  A progress note was completed today with significant improvements in pain, range of motion, strength, and functional mobility compared to last progress note, please see exam for details. Dominga continues to have difficulty with anterior hip catching, due to pivoting with increased herbert.  The above impairments and functional deficits will continue to be addressed over the course of this plan of care. Dominga was educated on continued progression of PT, expectations for the duration of care, updates on goals set at initial evaluation, and home exercise program.  Handouts were not issued during today's visit with instructions on what exercise to perform up until next visit. carlotta Marshall Is progressing well towards her goals.   Pt prognosis is Good.     Pt will continue to benefit from skilled outpatient physical therapy to address the deficits listed in the problem list box on initial evaluation, provide pt/family education and to maximize pt's level of independence in the home and community  environment.     Pt's spiritual, cultural and educational needs considered and pt agreeable to plan of care and goals.    Anticipated barriers to physical therapy: none       SHORT TERM GOALS:  4 weeks (3/29/24) Progress Date Met   Recent signs and systems trend is improving in order to progress towards Long term goals.  [x] Met  [] Not Met  [] Progressing  5/3/24   Patient will be independent with Home Exercise Program  in order to further progress and return to maximal function. [x] Met  [] Not Met  [] Progressing  5/3/24   Pain rating at Worst: 5 /10 in order to progress towards increased independence with activity. [x] Met  [] Not Met  [] Progressing  5/3/24   Patient will be able to correct postural deviations in sitting and standing, to decrease pain and promote postural awareness for injury prevention.  [x] Met  [] Not Met  [] Progressing  5/3/24   Patient will improve functional outcome (FOTO) score: by 5% to increase self-worth & perceived functional ability towards long term goals [x] Met  [] Not Met  [] Progressing  5/3/24      LONG TERM GOALS: 12 weeks (5/29/24) Progress Date Met   Patient will return to normal activites of daily living, recreational, and work related activities with less pain and limitation.  [] Met  [] Not Met  [x] Progressing     Patient will improve range of motion  to stated goals in order to return to maximal functional potential.  [] Met  [] Not Met  [x] Progressing     Patient will improve Strength to stated goals of appropriate musculature in order to improve functional independence.  [] Met  [] Not Met  [x] Progressing     Pain Rating at Best: 1/10 to improve Quality of Life.  [] Met  [] Not Met  [x] Progressing     Patient will meet predicted functional outcome (FOTO) score: 80% to increase self-worth & perceived functional ability. [] Met  [] Not Met  [] Progressing     Patient will have met/partially met personal goal of: improve pain and function and returning to walking  and prolonged activity without pain.  [x] Met  [] Not Met  [] Progressing             PLAN     Plan of care Certification: 2/29/2024 to 5/29/2024  Continue Plan of Care (POC) and progress per patient tolerance. See Treatment section for exercise progression.    Maricruz Alarcon, PT

## 2024-05-05 ENCOUNTER — PATIENT MESSAGE (OUTPATIENT)
Dept: PRIMARY CARE CLINIC | Facility: CLINIC | Age: 44
End: 2024-05-05
Payer: COMMERCIAL

## 2024-05-08 ENCOUNTER — OFFICE VISIT (OUTPATIENT)
Dept: PRIMARY CARE CLINIC | Facility: CLINIC | Age: 44
End: 2024-05-08
Payer: COMMERCIAL

## 2024-05-08 VITALS
DIASTOLIC BLOOD PRESSURE: 70 MMHG | HEIGHT: 66 IN | HEART RATE: 93 BPM | SYSTOLIC BLOOD PRESSURE: 127 MMHG | TEMPERATURE: 98 F | BODY MASS INDEX: 29.41 KG/M2 | WEIGHT: 183 LBS | OXYGEN SATURATION: 98 %

## 2024-05-08 DIAGNOSIS — E66.3 OVERWEIGHT (BMI 25.0-29.9): ICD-10-CM

## 2024-05-08 DIAGNOSIS — M54.9 UPPER BACK PAIN: Primary | ICD-10-CM

## 2024-05-08 DIAGNOSIS — R68.89 ABNORMAL WEIGHT: ICD-10-CM

## 2024-05-08 DIAGNOSIS — K21.9 GASTROESOPHAGEAL REFLUX DISEASE, UNSPECIFIED WHETHER ESOPHAGITIS PRESENT: ICD-10-CM

## 2024-05-08 PROCEDURE — 1159F MED LIST DOCD IN RCRD: CPT | Mod: CPTII,S$GLB,, | Performed by: NURSE PRACTITIONER

## 2024-05-08 PROCEDURE — 99214 OFFICE O/P EST MOD 30 MIN: CPT | Mod: S$GLB,,, | Performed by: NURSE PRACTITIONER

## 2024-05-08 PROCEDURE — 1160F RVW MEDS BY RX/DR IN RCRD: CPT | Mod: CPTII,S$GLB,, | Performed by: NURSE PRACTITIONER

## 2024-05-08 PROCEDURE — 99999 PR PBB SHADOW E&M-EST. PATIENT-LVL III: CPT | Mod: PBBFAC,,, | Performed by: NURSE PRACTITIONER

## 2024-05-08 PROCEDURE — 3078F DIAST BP <80 MM HG: CPT | Mod: CPTII,S$GLB,, | Performed by: NURSE PRACTITIONER

## 2024-05-08 PROCEDURE — 3008F BODY MASS INDEX DOCD: CPT | Mod: CPTII,S$GLB,, | Performed by: NURSE PRACTITIONER

## 2024-05-08 PROCEDURE — 3074F SYST BP LT 130 MM HG: CPT | Mod: CPTII,S$GLB,, | Performed by: NURSE PRACTITIONER

## 2024-05-08 RX ORDER — FAMOTIDINE 40 MG/1
40 TABLET, FILM COATED ORAL 2 TIMES DAILY PRN
Qty: 90 TABLET | Refills: 0 | Status: SHIPPED | OUTPATIENT
Start: 2024-05-08 | End: 2025-05-08

## 2024-05-10 ENCOUNTER — HOSPITAL ENCOUNTER (OUTPATIENT)
Dept: RADIOLOGY | Facility: HOSPITAL | Age: 44
Discharge: HOME | End: 2024-05-10
Attending: NURSE PRACTITIONER
Payer: COMMERCIAL

## 2024-05-10 DIAGNOSIS — M54.9 UPPER BACK PAIN: ICD-10-CM

## 2024-05-10 PROCEDURE — 72070 X-RAY EXAM THORAC SPINE 2VWS: CPT | Mod: 26,,, | Performed by: RADIOLOGY

## 2024-05-10 PROCEDURE — 72040 X-RAY EXAM NECK SPINE 2-3 VW: CPT | Mod: TC

## 2024-05-10 PROCEDURE — 72040 X-RAY EXAM NECK SPINE 2-3 VW: CPT | Mod: 26,,, | Performed by: RADIOLOGY

## 2024-05-10 PROCEDURE — 72070 X-RAY EXAM THORAC SPINE 2VWS: CPT | Mod: TC

## 2024-05-23 ENCOUNTER — OFFICE VISIT (OUTPATIENT)
Dept: PRIMARY CARE CLINIC | Facility: CLINIC | Age: 44
End: 2024-05-23
Payer: COMMERCIAL

## 2024-05-23 VITALS — BODY MASS INDEX: 31.07 KG/M2 | WEIGHT: 182 LBS | HEIGHT: 64 IN

## 2024-05-23 DIAGNOSIS — Z74.09 DECREASED MOBILITY AND ENDURANCE: ICD-10-CM

## 2024-05-23 DIAGNOSIS — E04.1 THYROID NODULE: ICD-10-CM

## 2024-05-23 DIAGNOSIS — M50.322 DEGENERATION OF C5-C6 INTERVERTEBRAL DISC: Primary | ICD-10-CM

## 2024-05-23 PROCEDURE — 99215 OFFICE O/P EST HI 40 MIN: CPT | Mod: 95,,, | Performed by: FAMILY MEDICINE

## 2024-05-23 PROCEDURE — 3008F BODY MASS INDEX DOCD: CPT | Mod: CPTII,95,, | Performed by: FAMILY MEDICINE

## 2024-05-23 PROCEDURE — 1160F RVW MEDS BY RX/DR IN RCRD: CPT | Mod: CPTII,95,, | Performed by: FAMILY MEDICINE

## 2024-05-23 PROCEDURE — 1159F MED LIST DOCD IN RCRD: CPT | Mod: CPTII,95,, | Performed by: FAMILY MEDICINE

## 2024-05-23 PROCEDURE — G2211 COMPLEX E/M VISIT ADD ON: HCPCS | Mod: 95,,, | Performed by: FAMILY MEDICINE

## 2024-05-23 RX ORDER — SEMAGLUTIDE 0.25 MG/.5ML
0.25 INJECTION, SOLUTION SUBCUTANEOUS
Qty: 2 ML | Refills: 1 | Status: SHIPPED | OUTPATIENT
Start: 2024-05-23

## 2024-05-23 NOTE — PATIENT INSTRUCTIONS
Meal Ideas for Regular Bariatric Diet/ or high protein diet plan from [facility]  *Recipes and products available at [email or web address]  Menu Plan: 6155-6168 Calories;  grams of Protein     DAY 1     Breakfast  ½ cup 2% cottage cheese (or  zero sugar oikos greek yogurt 6oz)  ¼ cup fruit (no sugar added)     Snack  2% mozzarella string cheese  10-20 grapes     Lunch  4-6oz Lean hamburger or turkey luiz  1 slice low-fat cheese  ¼-1/2 cup green beans     Snack  200 calorie low-carb protein drink (4 grams sugar or less)     Dinner  4oz chicken thigh/ breast  ¼-1 cup cooked spinach      Snack  Atkins bar (15g protein)        DAY 2     Breakfast  1 -2egg with 1oz shredded cheddar cheese and 2T salsa     Snack  200 calorie low-carb protein drink (4 grams sugar or less)     Lunch  Lettuce Wraps: 4oz sliced turkey, 1 slice low-fat Swiss cheese, tomato, and mustard wrapped in a Luis lettuce leaf     Snack  ½ cup low-fat cottage cheese _(yogurt)  Pear cup (no sugar added)     Dinner  6oz baked fish  ½ cup cooked broccoli     Snack  Sugar-free pudding cup        DAY 3     Breakfast   ½ cup low-fat ricotta cheese w/ Splenda to nila  ½ scoop Vanilla protein powder   ¼ cup fresh fruit     Snack  2% string cheese  20 unsalted almonds     Lunch  Tuna/Chicken Salad: 4-6oz canned tuna/chicken, 1 egg white, and 1 tsp light coleman  Pineapple cup (no sugar added)     Snack  200 calorie low-carb protein drink (4 grams sugar or less)     Dinner  ½ -1baked pork chop   ¼ cup beans        DAY 4     Breakfast  200 calorie low-carb protein drink (4 grams sugar or less) ( premier protein)     Snack  Boiled egg 1-2     Lunch  ½-1 cup grilled shrimp  Salad w/ 2 tbsp crumbled fat-free feta  1 tbsp light vinaigrette     Snack  200 calorie low-carb protein drink (4 grams sugar or less)     Dinner  ¾ -1cup red beans     Snack  Mini Babybell light        DAY 5     Breakfast  [place] pie: 3oz Greek yogurt, 1 tbsp Splenda, ½  individual pack Crystal Light lemonade. Top with ¼ cup chopped walnuts      Snack  3-4 lean ham or turkey slices, ¼ - ½ cup fruit     Lunch  Fiesta Chicken: 2oz canned chicken, 1oz shredded cheddar cheese, ¼ cup black beans  Top with 2 tbsp salsa and a small dollop light sour cream     Snack  200 calorie low-carb protein drink (4 grams sugar or less)     Dinner  Omelette: ¼ cup Egg Beaters, 4 large (1oz) shrimp, 1oz shredded low-fat cheese. Add bell pepper, onion, mushrooms, green onions, or salsa, optional.        DAY 6     Breakfast  1 netta or 2 links turkey sausage  ½ cup fruit     Snack  200 calorie low-carb protein drink (4 grams sugar or less)     Lunch  Grilled tilapia  Salad of baby spinach leaves with light dressing     Snack  200 calorie low-carb protein drink (4 grams sugar or less)     Dinner  Chicken thigh simmered in 98% fat free cream of mushroom soup  ½ -1cup cooked green beans     Meal Ideas for Regular Bariatric Diet  *Recipes and products available at [email or web address]        Breakfast: (15-20g protein)    - Egg white omelet: 2 egg whites or ½ cup Egg Beaters. (Optional proteins: cheese, shrimp, black beans, chicken, sliced turkey) (Optional veggies: tomatoes, salsa, spinach, mushrooms, onions, green peppers, or small slice avocado)     - Egg and sausage: 1 egg or ¼ cup Egg Beaters (any variety), with 1 netta or 2 links of Turkey sausage or Veggie breakfast sausage (My 1% or CrossLoop)     - Crust-less breakfast quiche: To make a glass pie dish, mix 4oz part skim Ricotta, 1 cup skim milk, and 2 eggs as your base. Add protein: shredded cheese, sliced lean ham or turkey, turkey downs/sausage. Add veggies: tomato, onion, green onion, mushroom, green pepper, spinach, etc.     - Yogurt parfait: Mix 1 - 6oz container Dannon Light N Fit vanilla yogurt, with ¼ cup crushed unsalted nuts    - Cottage cheese and fruit: ½ cup part-skim cottage cheese or ricotta cheese topped with fresh fruit or  sugar free preserves     - Ana Foster's Vanilla Egg custard* (add 2 Tbsp instant coffee granules to make Cappuccino Custard*)    - Hi-Protein café latte (skim milk, decaf coffee, 1 scoop protein powder). Optional to add Sugar free syrup or extract flavoring.     - Breakfast Lox: spread fat free cream cheese on slices of smoked salmon. Serve over scrambled or egg over easy (sauteed with nonstick cookspray) OR on a cucumber slice     - Eggwhich: Scramble or cook 1 large egg over easy using nonstick cookspray. Place between 2 slices of [place] downs and low fat cheese.     Lunch: (20-30g protein)    - ½ cup Black bean soup (Homemade or Progresso), with ¼ cup shredded low-fat cheese. Top with chopped tomato or fresh salsa.     - Lean deli turkey breast and low-fat sliced cheese, mustard or light coleman to moisten, rolled up together, or wrapped in a Luis lettuce leaf    - Chicken salad made from dinner leftovers, moisten with low-fat salad dressing or light coleman. Also try leftover salmon, shrimp, tuna or boiled eggs. Serve ½ cup over dark green salad     - Fat-free canned refried beans, topped with ¼ cup shredded low-fat cheese. Top with chopped tomato or fresh salsa.      - Greek salad: Top mixed greens with 1-2oz grilled chicken, tomatoes, red onions, 2-3 kalamata olives, and sprinkle lightly with feta cheese. Spritz with Balsamic vinegar to taste.      - Crust-less lunch quiche: To make a glass pie dish, mix 4oz part skim Ricotta, 1 cup skim milk, and 2 eggs as your base. Add protein: shredded cheese, sliced lean ham or turkey, shrimp, chicken. Add veggies: tomato, onion, green onion, mushroom, green pepper, spinach, artichoke, broccoli, etc.    - Pizza bake: spread a  ke myles mushroom with tomato sauce, low-fat shredded mozzarella and turkey pepperoni or [place] downs. Add any veggies. Roast for 10-15 minutes, until cheese melted.      - Cucumber crab bites: Spread ¼ cup crab dip (lump crabmeat + light cream  cheese and green onions) over sliced cucumber.      - Chicken with light spinach and artichoke dip*: Puree in : 6oz cooked and drained spinach, 2 cloves garlic, 1 can cannelloni beans, ½ cup chopped green onions, 1 can drained artichoke hearts (not marinated in oil), lemon juice and basil. Mix in 2oz chopped up chicken.     Supper: (20-30g protein)    - Serve grilled fish over dark green salad tossed with low-fat dressing, served with grilled asparagus vu      - Rotisserie chicken salad: served with sliced strawberries, walnuts, fat-free feta cheese crumbles and 1 tbsp Olsens Own Light Raspberry [place] Vinaigrette    - Shrimp cocktail: Dip cold boiled shrimp in homemade low-sugar cocktail sauce (1/2 cup Kaden One Carb ketchup, 2 tbsp horseradish, 1/4 tsp hot sauce, 1 tsp Worcestershire sauce, 1 tbsp freshly-squeezed lemon juice). Serve with dark green salad, walnuts, and crumbled blue cheese drizzled with olive oil and Balsamic vinegar     - Tuna Melt: Spread tuna salad onto 2 thick slices of tomato. Top with low-fat cheese and broil until cheese is melted. May also be made with chicken salad of shrimp salad. [place] with different types of cheeses.     - Chicken or beef fajitas (no tortilla, rice, beans, chips). Top meat and veggies w/ fresh salsa, fat free sour cream.     - Homemade low-fat Chili using extra lean ground beef or ground turkey. Top with shredded cheese and salsa as desired. May add dollop fat-free sour cream if desired     - Chicken parmesan: Top chicken breast w/ low sugar marinara sauce, mozzarella cheese and bake until chicken reaches 165*.  Serve w/ spaghetti SQUASH or Chinese cut green beans     - Dinner Omelet with shrimp or chicken and onion, green peppers and chives.     - No noodle lasagna: Use sliced zucchini or eggplant in place of noodles.  Layer with part skim ricotta cheese and low sugar meat sauce (use very lean ground beef or ground turkey).     - Mexican  chicken bake: Bake chunks of chicken breast or thigh with taco seasoning, Pace brand enchilada sauce, green onions and low-fat cheese. Serve with ¼ cup black beans or fat free refried beans topped with chopped tomatoes or salsa.    - Lili frozen meatballs, simmered in Classico Marinara sauce. Different flavors of salsa or spaghetti sauce create different dishes! Sprinkle with parmesan cheese. Serve with grilled or steamed veggies, or a dark green salad.    - Simmer boneless skinless chicken thigh chunks in Classico Marinara sauce or roasted salsa until tender with chopped onion, bell pepper, garlic, mushrooms, spinach, etc.     - Hamburger or veggie burger, without the bun, dressed the way you like. Served with grilled or steamed veggies.     - Eggplant parmesan: Bake slices of eggplant at 350 degrees for 15 minutes. Layer tomato sauce, sliced eggplant and low-fat mozzarella cheese in a baking dish and cover with foil. Bake 30-40 more minutes or until bubbly. Uncover and bake at 400 degrees for about 15 more minutes, or until top is slightly crisp.     - Fish tacos: grilled/baked white fish, wrapped in Luis lettuce leaf, topped with salsa, shredded low-fat cheese, and light coleslaw.     - Chicken charley: Sprinkle chicken w/ 1 tsp of hidden [place] ranch dip mix. Then grill chicken and top with black beans, salsa and 1 tsp fat free sour cream.      - Cauliflower pizza crust: Use cauliflower as crust (see recipe on pinterest, no flour!). Top w/ low fat cheese, turkey pepperoni and veggies and bake again     - chicken or turkey crust pizza: use ground chicken or turkey instead of cauliflower, spread in [place] and bake at 350 for about 20-30 minutes(may want to add garlic, black pepper, oregano and other herbs to ground meat mixture).  Remove and top w/ low fat cheese, turkey pepperoni and veggies and bake again for another 10 minutes or until cheese is browned.      Snacks: (100-200 calories; >5g protein)      - 1 low-fat cheese stick with 8 cherry tomatoes or 1 serving fresh fruit  - 4 thin slices fat-free turkey breast and 1 slice low-fat cheese  - 4 thin slices fat-free honey ham with wedge of melon  - 6-8 edamame pods (equivalent to about 1/4 cup edamame without pods).   - 1/4 cup unsalted nuts with ½ cup fruit  - 6-oz container Dannon Light n Fit vanilla yogurt, topped with 1oz unsalted nuts         - apple, celery or baby carrots spread with 2 Tbsp PB2  - apple slices with 1 oz slice low-fat cheese  - Apple slices dipped in 2 Tbsp of PB2  - celery, cucumber, bell pepper or baby carrots dipped in ¼ cup hummus bean spread or light spinach and artichoke dip (*recipe in lunch section)  - celery, cucumber, baby carrots dipped in high protein greek yogurt (Mix 16 oz plain greek yogurt + 1 packet of hidden valley ranch dip mix)  - Ricky Links Beef Steak - 14g protein! (similar to beef jerky)  - 2 wedges Laughing Cow - Light Herb & Garlic Cheese with sliced cucumber or green bell pepper  - 1/2 cup low-fat cottage cheese with ¼ cup fruit or ¼ cup salsa  - RTD Protein drinks: Atkins, Low Carb Slim Fast, EAS light, Muscle Milk Light, etc.  - Homemade Protein drinks: GNC Soy95, Isopure, Nectar, UNJURY, Whey Gourmet, etc. Mix 1 scoop powder with 8oz skim/1% milk or light soymilk.  - Protein bars: Atkins, EAS, Pure Protein, Think Thin, Detour, etc. Must have 0-4 grams sugar - Read the label.     Takeout Options: No more than twice/week  Deli - Salads (no pasta or rice), meats, cheeses. Roasted chicken. Lox (salmon)     Mexican - Platters which don't include tortillas, chips, or rice. Go easy on the beans. Example: Fajitas without the tortillas. Ask the  not to bring chips to the table if they are too tempting.     Greek - Meat or fish and vegetable, but no bread or rice. Including hummus, baba ganoush, etc, is OK. Most sit-down Greek restaurants can provide you with cucumber slices for dipping instead of daniella bread.      Fast Food (Avoid as much as possible) - Salads (no croutons and limit salad dressing to 2 tbsp), grilled chicken sandwich without the bun and ask for no coleman. Nancys low fat chili or Taco Bell pintos and cheese.     BBQ - The meats are fine if you ask for sauces on the side, but most of the traditional side dishes are loaded with carbs. Pablo slaw, baked beans and BBQ sauce are typically made with sugar.     Chinese - Nothing deep-fried, no rice or noodles. Many Chinese sauces have starch and sugar in them, so you'll have to use your judgement. If you find that these sauces trigger cravings, or cause Dumping, you can ask for the sauce to be made without sugar or just use soy sauce.

## 2024-05-23 NOTE — PROGRESS NOTES
Subjective:      Patient ID: Dominga Contreras is a 43 y.o. female.    Chief Complaint: Obesity and Back Pain    The patient location is: home  Visit type: video and audio simultaneous    Patient is a 43 y.o. female coming in today for f/u.  She recently met with ZULY Carmona, for back pain assessment. Reports back pain has improved since last visit. She is also inquiring about weight loss. Reports she has tried to lose weight through diet and exercise; states recent abnormal weight gain. Current weight is 180 lbs. No Hx of DM or prediabetes. Discussed at length options for semaglutide injections for weight loss as well as costs. Also informed pt of nationwide shortage. Will provide diet plan to help with weight loss. No other health concern at this time.       [facility] Reason For Visit    5/23/2024 12:24 PM CDT - Filed by Patient   What is your primary reason for visit? Other/Annual   Have you experienced any of the following:   Change in activity? No   Unexpected weight change? Yes   Neck pain? Yes   Hearing loss? No   Runny nose? No   Trouble swallowing? No   Eye discharge? No   Changes in vision? Yes   Chest tightness? No   Wheezing? No   Chest pain? No   Heart beating fast or racing? No   Blood in stool? No   Constipation? No   Vomiting? No   Diarrhea? No   Drinking much more than usual? No   Urinating much more than usual? No   Difficulty urinating? No   Blood in the urine? No   Menstrual problem? No   Painful urination? No   Joint swelling? No   Joint pain? Yes   Headaches? No   Weakness? No   Confusion? No   Feeling depressed? No         Pmh, Psh, Family Hx, Social Hx updated in Epic Tabs today.     LABS:   Lab Results   Component Value Date    WBC 5.58 07/18/2023    HGB 12.9 07/18/2023    HCT 38.8 07/18/2023     07/18/2023    CHOL 214 (H) 07/18/2023    TRIG 194 (H) 07/18/2023    HDL 66 07/18/2023    ALT 21 07/18/2023    AST 22 07/18/2023     07/18/2023    K 4.0 07/18/2023     07/18/2023     CREATININE 0.8 07/18/2023    BUN 14 07/18/2023    CO2 22 (L) 07/18/2023    TSH 1.312 07/18/2023    HGBA1C 4.9 07/18/2023       X-Ray Thoracic Spine AP Lateral  Narrative: EXAMINATION:  XR THORACIC SPINE AP LATERAL    CLINICAL HISTORY:  Dorsalgia, unspecified    TECHNIQUE:  AP and lateral views of the thoracic spine were performed.    COMPARISON:  None    FINDINGS:  Vertebral body heights are maintained.  Disc spaces are maintained.  Alignment is maintained.  No acute fracture.  Surrounding soft tissues are unremarkable.  Impression: No acute finding.    Electronically signed by: Darrick Hatch  Date:    05/10/2024  Time:    09:44  X-Ray Cervical Spine AP And Lateral  Narrative: EXAM:   XR CERVICAL SPINE AP LATERAL, 4 images    CLINICAL INDICATION:  Cervicalgia    FINDINGS:  No comparison studies are available. There is normal alignment of the 7 cervical vertebra. Mild C5/C6 disc height reduction. The vertebral body heights and intervertebral disc heights are otherwise well maintained. The posterior elements are intact and the prevertebral soft tissues are normal thickness. The orientation of the dens and the lateral masses is normal.    The lung apices are clear.  Impression: 1.  Negative for acute process involving the cervical spine.  2.  Mild C5/C6 disc height reduction.    Finalized on: 5/10/2024 8:24 AM By:  Sushil Blanchard MD  [facility]# [id number]      2024-05-10 08:26:23.931    [facility]        Review of Systems   Constitutional:  Positive for unexpected weight change. Negative for activity change.   HENT:  Negative for hearing loss, rhinorrhea and trouble swallowing.    Eyes:  Positive for visual disturbance. Negative for discharge.   Respiratory:  Negative for chest tightness and wheezing.    Cardiovascular:  Negative for chest pain and palpitations.   Gastrointestinal:  Negative for blood in stool, constipation, diarrhea and vomiting.   Endocrine: Negative for polydipsia and polyuria.   Genitourinary:  Negative for  "difficulty urinating, dysuria, hematuria and menstrual problem.   Musculoskeletal:  Positive for arthralgias and neck pain. Negative for joint swelling.   Neurological:  Negative for weakness and headaches.   Psychiatric/Behavioral:  Negative for confusion and dysphoric mood.      Objective:     Vitals:    05/23/24 1236   Weight: 82.6 kg (182 lb)   Height: 5' 4" (1.626 m)     Wt Readings from Last 10 Encounters:   05/23/24 82.6 kg (182 lb)   05/22/24 79.8 kg (176 lb)   05/08/24 83 kg (182 lb 15.7 oz)   03/01/24 81.2 kg (179 lb)   01/12/24 82 kg (180 lb 12.4 oz)   12/17/23 82 kg (180 lb 12.4 oz)   12/07/23 80.2 kg (176 lb 12.9 oz)   07/18/23 75.8 kg (167 lb)   02/22/23 75.8 kg (167 lb)   01/06/23 77.1 kg (169 lb 15.6 oz)     Physical Exam  Vitals reviewed.   Constitutional:       General: She is awake. She is not in acute distress.     Appearance: Normal appearance. She is well-developed, well-groomed and normal weight. She is not ill-appearing.   HENT:      Head: Normocephalic and atraumatic.      Right Ear: External ear normal.      Left Ear: External ear normal.      Nose: Nose normal.      Mouth/Throat:      Lips: Pink.   Eyes:      Conjunctiva/sclera: Conjunctivae normal.   Pulmonary:      Effort: Pulmonary effort is normal.   Neurological:      Mental Status: She is alert.   Psychiatric:         Attention and Perception: Attention and perception normal. She is attentive.         Mood and Affect: Mood and affect normal. Mood is not anxious or depressed. Affect is not labile, blunt, angry or inappropriate.         Speech: Speech normal. She is communicative. Speech is not rapid and pressured, delayed, slurred or tangential.         Behavior: Behavior normal. Behavior is not agitated, slowed, aggressive, withdrawn, hyperactive or combative. Behavior is cooperative.         Thought Content: Thought content normal. Thought content is not paranoid or delusional. Thought content does not include homicidal or suicidal " "ideation. Thought content does not include homicidal or suicidal plan.         Cognition and Memory: Cognition and memory normal. Memory is not impaired. She does not exhibit impaired recent memory or impaired remote memory.         Judgment: Judgment normal. Judgment is not impulsive or inappropriate.       Assessment:     1. Degeneration of C5-C6 intervertebral disc    2. BMI 31.0-31.9,adult    3. Thyroid nodule (right)    4. Decreased mobility and endurance      Plan:   Dominga Kothari" was seen today for obesity and back pain.    Diagnoses and all orders for this visit:    Degeneration of C5-C6 intervertebral disc    BMI 31.0-31.9,adult  -     semaglutide, weight loss, (WEGOVY) 0.25 mg/0.5 mL PnIj; Inject 0.25 mg into the skin every 7 days.    Thyroid nodule (right)    Decreased mobility and endurance      Cervical disc degeneration is controlled at this time.   Elevated BMI is not controlled at this time.   New Rx Wegovy 0.25 mg/week for weight loss treatment. Pt will check if medication is covered by insurance. Discussed at length dietary changes to help with weight loss which may also help with cervical pain treatment.   Provided diet for pt to follow alongside medication.   Instructed to f/u in 1 month with ZULY for Wegovy f/u.     Face to Face time with patient: 12:29 PM-12:54 PM       45    minutes of total time spent on the encounter, which includes face to face time and non-face to face time preparing to see the patient (eg, review of tests), Obtaining and/or reviewing separately obtained history, Documenting clinical information in the electronic or other health record, Independently interpreting results (not separately reported) and communicating results to the patient/family/caregiver, or Care coordination (not separately reported).     Each patient to whom he or she provides medical services by telemedicine is:  (1) informed of the relationship between the physician and patient and the respective role " of any other health care provider with respect to management of the patient; and (2) notified that he or she may decline to receive medical services by telemedicine and may withdraw from such care at any time.      Follow up in about 1 month (around 6/23/2024) for f/u VV Mathew Brandon NP or Dr. Monique/ josé miguel williso.    Patient Instructions     Meal Ideas for Regular Bariatric Diet/ or high protein diet plan from [facility]  *Recipes and products available at [email or web address]  Menu Plan: 3005-8531 Calories;  grams of Protein     DAY 1     Breakfast  ½ cup 2% cottage cheese (or  zero sugar oikos greek yogurt 6oz)  ¼ cup fruit (no sugar added)     Snack  2% mozzarella string cheese  10-20 grapes     Lunch  4-6oz Lean hamburger or turkey luiz  1 slice low-fat cheese  ¼-1/2 cup green beans     Snack  200 calorie low-carb protein drink (4 grams sugar or less)     Dinner  4oz chicken thigh/ breast  ¼-1 cup cooked spinach      Snack  Atkins bar (15g protein)        DAY 2     Breakfast  1 -2egg with 1oz shredded cheddar cheese and 2T salsa     Snack  200 calorie low-carb protein drink (4 grams sugar or less)     Lunch  Lettuce Wraps: 4oz sliced turkey, 1 slice low-fat Swiss cheese, tomato, and mustard wrapped in a Luis lettuce leaf     Snack  ½ cup low-fat cottage cheese _(yogurt)  Pear cup (no sugar added)     Dinner  6oz baked fish  ½ cup cooked broccoli     Snack  Sugar-free pudding cup        DAY 3     Breakfast   ½ cup low-fat ricotta cheese w/ Splenda to nila  ½ scoop Vanilla protein powder   ¼ cup fresh fruit     Snack  2% string cheese  20 unsalted almonds     Lunch  Tuna/Chicken Salad: 4-6oz canned tuna/chicken, 1 egg white, and 1 tsp light coleman  Pineapple cup (no sugar added)     Snack  200 calorie low-carb protein drink (4 grams sugar or less)     Dinner  ½ -1baked pork chop   ¼ cup beans        DAY 4     Breakfast  200 calorie low-carb protein drink (4 grams sugar or less) ( premier  protein)     Snack  Boiled egg 1-2     Lunch  ½-1 cup grilled shrimp  Salad w/ 2 tbsp crumbled fat-free feta  1 tbsp light vinaigrette     Snack  200 calorie low-carb protein drink (4 grams sugar or less)     Dinner  ¾ -1cup red beans     Snack  Mini Babybell light        DAY 5     Breakfast  [place] pie: 3oz Greek yogurt, 1 tbsp Splenda, ½ individual pack Crystal Light lemonade. Top with ¼ cup chopped walnuts      Snack  3-4 lean ham or turkey slices, ¼ - ½ cup fruit     Lunch  Fiesta Chicken: 2oz canned chicken, 1oz shredded cheddar cheese, ¼ cup black beans  Top with 2 tbsp salsa and a small dollop light sour cream     Snack  200 calorie low-carb protein drink (4 grams sugar or less)     Dinner  Omelette: ¼ cup Egg Beaters, 4 large (1oz) shrimp, 1oz shredded low-fat cheese. Add bell pepper, onion, mushrooms, green onions, or salsa, optional.        DAY 6     Breakfast  1 netta or 2 links turkey sausage  ½ cup fruit     Snack  200 calorie low-carb protein drink (4 grams sugar or less)     Lunch  Grilled tilapia  Salad of baby spinach leaves with light dressing     Snack  200 calorie low-carb protein drink (4 grams sugar or less)     Dinner  Chicken thigh simmered in 98% fat free cream of mushroom soup  ½ -1cup cooked green beans     Meal Ideas for Regular Bariatric Diet  *Recipes and products available at [email or web address]        Breakfast: (15-20g protein)    - Egg white omelet: 2 egg whites or ½ cup Egg Beaters. (Optional proteins: cheese, shrimp, black beans, chicken, sliced turkey) (Optional veggies: tomatoes, salsa, spinach, mushrooms, onions, green peppers, or small slice avocado)     - Egg and sausage: 1 egg or ¼ cup Egg Beaters (any variety), with 1 netta or 2 links of Turkey sausage or Veggie breakfast sausage (Kirill Farms or Boca)     - Crust-less breakfast quiche: To make a glass pie dish, mix 4oz part skim Ricotta, 1 cup skim milk, and 2 eggs as your base. Add protein: shredded cheese,  sliced lean ham or turkey, turkey downs/sausage. Add veggies: tomato, onion, green onion, mushroom, green pepper, spinach, etc.     - Yogurt parfait: Mix 1 - 6oz container Dannon Light N Fit vanilla yogurt, with ¼ cup crushed unsalted nuts    - Cottage cheese and fruit: ½ cup part-skim cottage cheese or ricotta cheese topped with fresh fruit or sugar free preserves     - Ana Foster's Vanilla Egg custard* (add 2 Tbsp instant coffee granules to make Cappuccino Custard*)    - Hi-Protein café latte (skim milk, decaf coffee, 1 scoop protein powder). Optional to add Sugar free syrup or extract flavoring.     - Breakfast Lox: spread fat free cream cheese on slices of smoked salmon. Serve over scrambled or egg over easy (sauteed with nonstick cookspray) OR on a cucumber slice     - Eggwhich: Scramble or cook 1 large egg over easy using nonstick cookspray. Place between 2 slices of Iranian downs and low fat cheese.     Lunch: (20-30g protein)    - ½ cup Black bean soup (Homemade or Progresso), with ¼ cup shredded low-fat cheese. Top with chopped tomato or fresh salsa.     - Lean deli turkey breast and low-fat sliced cheese, mustard or light coleman to moisten, rolled up together, or wrapped in a Luis lettuce leaf    - Chicken salad made from dinner leftovers, moisten with low-fat salad dressing or light coleman. Also try leftover salmon, shrimp, tuna or boiled eggs. Serve ½ cup over dark green salad     - Fat-free canned refried beans, topped with ¼ cup shredded low-fat cheese. Top with chopped tomato or fresh salsa.      - Greek salad: Top mixed greens with 1-2oz grilled chicken, tomatoes, red onions, 2-3 kalamata olives, and sprinkle lightly with feta cheese. Spritz with Balsamic vinegar to taste.      - Crust-less lunch quiche: To make a glass pie dish, mix 4oz part skim Ricotta, 1 cup skim milk, and 2 eggs as your base. Add protein: shredded cheese, sliced lean ham or turkey, shrimp, chicken. Add veggies: tomato, onion,  green onion, mushroom, green pepper, spinach, artichoke, broccoli, etc.    - Pizza bake: spread a  ke myles mushroom with tomato sauce, low-fat shredded mozzarella and turkey pepperoni or [place] downs. Add any veggies. Roast for 10-15 minutes, until cheese melted.      - Cucumber crab bites: Spread ¼ cup crab dip (lump crabmeat + light cream cheese and green onions) over sliced cucumber.      - Chicken with light spinach and artichoke dip*: Puree in : 6oz cooked and drained spinach, 2 cloves garlic, 1 can cannelloni beans, ½ cup chopped green onions, 1 can drained artichoke hearts (not marinated in oil), lemon juice and basil. Mix in 2oz chopped up chicken.     Supper: (20-30g protein)    - Serve grilled fish over dark green salad tossed with low-fat dressing, served with grilled asparagus vu      - Rotisserie chicken salad: served with sliced strawberries, walnuts, fat-free feta cheese crumbles and 1 tbsp Olsens Own Light Raspberry Coin Vinaigrette    - Shrimp cocktail: Dip cold boiled shrimp in homemade low-sugar cocktail sauce (1/2 cup Kaden One Carb ketchup, 2 tbsp horseradish, 1/4 tsp hot sauce, 1 tsp Worcestershire sauce, 1 tbsp freshly-squeezed lemon juice). Serve with dark green salad, walnuts, and crumbled blue cheese drizzled with olive oil and Balsamic vinegar     - Tuna Melt: Spread tuna salad onto 2 thick slices of tomato. Top with low-fat cheese and broil until cheese is melted. May also be made with chicken salad of shrimp salad. [place] with different types of cheeses.     - Chicken or beef fajitas (no tortilla, rice, beans, chips). Top meat and veggies w/ fresh salsa, fat free sour cream.     - Homemade low-fat Chili using extra lean ground beef or ground turkey. Top with shredded cheese and salsa as desired. May add dollop fat-free sour cream if desired     - Chicken parmesan: Top chicken breast w/ low sugar marinara sauce, mozzarella cheese and bake until chicken  reaches 165*.  Serve w/ spaghetti SQUASH or [place] cut green beans     - Dinner Omelet with shrimp or chicken and onion, green peppers and chives.     - No noodle lasagna: Use sliced zucchini or eggplant in place of noodles.  Layer with part skim ricotta cheese and low sugar meat sauce (use very lean ground beef or ground turkey).     - Mexican chicken bake: Bake chunks of chicken breast or thigh with taco seasoning, Pace brand enchilada sauce, green onions and low-fat cheese. Serve with ¼ cup black beans or fat free refried beans topped with chopped tomatoes or salsa.    - Lili frozen meatballs, simmered in Classico Marinara sauce. Different flavors of salsa or spaghetti sauce create different dishes! Sprinkle with parmesan cheese. Serve with grilled or steamed veggies, or a dark green salad.    - Simmer boneless skinless chicken thigh chunks in Classico Marinara sauce or roasted salsa until tender with chopped onion, bell pepper, garlic, mushrooms, spinach, etc.     - Hamburger or veggie burger, without the bun, dressed the way you like. Served with grilled or steamed veggies.     - Eggplant parmesan: Bake slices of eggplant at 350 degrees for 15 minutes. Layer tomato sauce, sliced eggplant and low-fat mozzarella cheese in a baking dish and cover with foil. Bake 30-40 more minutes or until bubbly. Uncover and bake at 400 degrees for about 15 more minutes, or until top is slightly crisp.     - Fish tacos: grilled/baked white fish, wrapped in Luis lettuce leaf, topped with salsa, shredded low-fat cheese, and light coleslaw.     - Chicken charley: Sprinkle chicken w/ 1 tsp of [place] ranch dip mix. Then grill chicken and top with black beans, salsa and 1 tsp fat free sour cream.      - Cauliflower pizza crust: Use cauliflower as crust (see recipe on pinterest, no flour!). Top w/ low fat cheese, turkey pepperoni and veggies and bake again     - chicken or turkey crust pizza: use ground chicken or turkey  instead of cauliflower, spread in [place] and bake at 350 for about 20-30 minutes(may want to add garlic, black pepper, oregano and other herbs to ground meat mixture).  Remove and top w/ low fat cheese, turkey pepperoni and veggies and bake again for another 10 minutes or until cheese is browned.      Snacks: (100-200 calories; >5g protein)     - 1 low-fat cheese stick with 8 cherry tomatoes or 1 serving fresh fruit  - 4 thin slices fat-free turkey breast and 1 slice low-fat cheese  - 4 thin slices fat-free honey ham with wedge of melon  - 6-8 edamame pods (equivalent to about 1/4 cup edamame without pods).   - 1/4 cup unsalted nuts with ½ cup fruit  - 6-oz container Dannon Light n Fit vanilla yogurt, topped with 1oz unsalted nuts         - apple, celery or baby carrots spread with 2 Tbsp PB2  - apple slices with 1 oz slice low-fat cheese  - Apple slices dipped in 2 Tbsp of PB2  - celery, cucumber, bell pepper or baby carrots dipped in ¼ cup hummus bean spread or light spinach and artichoke dip (*recipe in lunch section)  - celery, cucumber, baby carrots dipped in high protein greek yogurt (Mix 16 oz plain greek yogurt + 1 packet of hidden [place] ranch dip mix)  - Ricky Links Beef Steak - 14g protein! (similar to beef jerky)  - 2 wedges Laughing Cow - Light Herb & Garlic Cheese with sliced cucumber or green bell pepper  - 1/2 cup low-fat cottage cheese with ¼ cup fruit or ¼ cup salsa  - RTD Protein drinks: Atkins, Low Carb Slim Fast, EAS light, Muscle Milk Light, etc.  - Homemade Protein drinks: GNC Soy95, Isopure, Nectar, UNJURY, Whey Gourmet, etc. Mix 1 scoop powder with 8oz skim/1% milk or light soymilk.  - Protein bars: Atkins, EAS, Pure Protein, Think Thin, Detour, etc. Must have 0-4 grams sugar - Read the label.     Takeout Options: No more than twice/week  Deli - Salads (no pasta or rice), meats, cheeses. Roasted chicken. Lox (salmon)     Mexican - Platters which don't include tortillas, chips, or rice. Go  easy on the beans. Example: Fajitas without the tortillas. Ask the  not to bring chips to the table if they are too tempting.     Greek - Meat or fish and vegetable, but no bread or rice. Including hummus, baba ganoush, etc, is OK. Most sit-down Greek restaurants can provide you with cucumber slices for dipping instead of daniella bread.     Fast Food (Avoid as much as possible) - Salads (no croutons and limit salad dressing to 2 tbsp), grilled chicken sandwich without the bun and ask for no coleman. Nancys low fat chili or Taco Bell pintos and cheese.     BBQ - The meats are fine if you ask for sauces on the side, but most of the traditional side dishes are loaded with carbs. Pablo slaw, baked beans and BBQ sauce are typically made with sugar.     Chinese - Nothing deep-fried, no rice or noodles. Many Chinese sauces have starch and sugar in them, so you'll have to use your judgement. If you find that these sauces trigger cravings, or cause Dumping, you can ask for the sauce to be made without sugar or just use soy sauce.     Visit today included increased complexity associated with the care of the episodic problem : obesity   and managing the longitudinal care of the patient due to the serious and/or complex managed problem(s) noted in the diagnosis section.     45 minutes of total time spent on the encounter, which includes face to face time and non-face to face time preparing to see the patient (eg, review of tests), Obtaining and/or reviewing separately obtained history, Documenting clinical information in the electronic or other health record, Independently interpreting results (not separately reported) and communicating results to the patient/family/caregiver, or Care coordination (not separately reported).      Scribe Attestation:   I, Jorge Bland, am scribing for, and in the presence of, Dr. Emely Monique MD. I performed the above scribed service and the documentation accurately describes the services I  performed. I attest to the accuracy of the note.    I, Dr. Emely Monique MD, reviewed documentation as scribed above. I personally performed the services described in this documentation.  I agree that the record reflects my personal performance and is accurate and complete. Emely Monique MD.    05/23/2024

## 2024-06-24 ENCOUNTER — PATIENT MESSAGE (OUTPATIENT)
Dept: ADMINISTRATIVE | Facility: HOSPITAL | Age: 44
End: 2024-06-24
Payer: COMMERCIAL

## 2024-06-24 ENCOUNTER — PATIENT MESSAGE (OUTPATIENT)
Dept: PRIMARY CARE CLINIC | Facility: CLINIC | Age: 44
End: 2024-06-24
Payer: COMMERCIAL

## 2024-08-01 DIAGNOSIS — Z12.11 ENCOUNTER FOR COLORECTAL CANCER SCREENING: Primary | ICD-10-CM

## 2024-08-01 DIAGNOSIS — Z12.12 ENCOUNTER FOR COLORECTAL CANCER SCREENING: Primary | ICD-10-CM

## 2024-08-08 DIAGNOSIS — Z00.00 ROUTINE GENERAL MEDICAL EXAMINATION AT A HEALTH CARE FACILITY: Primary | ICD-10-CM

## 2024-08-29 ENCOUNTER — PATIENT MESSAGE (OUTPATIENT)
Dept: PRIMARY CARE CLINIC | Facility: CLINIC | Age: 44
End: 2024-08-29
Payer: COMMERCIAL

## 2024-08-30 ENCOUNTER — E-VISIT (OUTPATIENT)
Dept: FAMILY MEDICINE | Facility: CLINIC | Age: 44
End: 2024-08-30
Payer: COMMERCIAL

## 2024-08-30 DIAGNOSIS — N76.0 ACUTE VAGINITIS: Primary | ICD-10-CM

## 2024-08-30 RX ORDER — FLUCONAZOLE 150 MG/1
150 TABLET ORAL ONCE
Qty: 1 TABLET | Refills: 1 | Status: SHIPPED | OUTPATIENT
Start: 2024-08-30 | End: 2024-08-30

## 2024-08-30 NOTE — PROGRESS NOTES
Patient ID: Dominga Contreras is a 43 y.o. female.    Chief Complaint: Vaginal Discharge (Entered automatically based on patient selection in [employer].)          274}  The patient initiated a request through [employer] on 8/30/2024 for evaluation and management with a chief complaint of Vaginal Discharge (Entered automatically based on patient selection in [employer].)     I evaluated the questionnaire submission on 08/30/2024 .    Total Time (in minutes): 7     [facility] Peq Evisit Vaginal Concerns    8/30/2024  9:07 AM CDT - Filed by Patient   Do you agree to participate in an E-Visit? Yes   If you have any of the following symptoms,  please do not complete an E-Visit,  schedule an appointment with your provider: I acknowledge   Choose the state of your primary residence [place]   Are you pregnant, could you be pregnant, or are you breast feeding? None of the above   What is the main issue you would like addressed today? Yeast infection   Which of the following vaginal concerns do you have? Itching   Do you have vaginal discharge? White/milky discharge   Do you have pain while passing urine? No   Do you have any of the following symptoms? None of the above    Have you taken antibiotics in the last two weeks? No    Do you use any of the following? No   Which of the following applies to your menstrual period? Expect soon   Which of the following applies to your menstrual cycle? Normal amount of bleeding   Do you have spotting between periods? No   Do you have pain with your period? No   Have you had similar symptoms in the past? More than once   When you had similar symptoms in the past, did any of the following work? Pills for yeast infection   Have you had a temperature of 100.4 or higher? No   Provide any additional information you feel is important. I have had yeast infections before. Maybe 1x every few years. I have tried OTC Monistat and no success. Diflucan has helped me in the past. I need a prescription for it called  in please. [facility]   Please attach any relevant images or files    Are you able to take your vital signs? No          Active Problem List with Overview Notes    Diagnosis Date Noted    BMI 31.0-31.9,adult 05/23/2024    Degeneration of C5-C6 intervertebral disc 05/23/2024    Decreased mobility and endurance 02/29/2024    Fever blister 12/07/2022    Thyroid nodule (right) 09/30/2022     Neg bx 2023  Sees Bak[facility]      At increased risk for hospitalization and death from COVID-19 infection due to unvaccinated state 09/30/2022    Sinus arrhythmia 08/25/2021    Stable low-risk nodule of left lung, < 4 mm 08/25/2021      Recent Labs Obtained:  Lab Results   Component Value Date    WBC 5.58 07/18/2023    HGB 12.9 07/18/2023    HCT 38.8 07/18/2023    MCV 86 07/18/2023     07/18/2023     07/18/2023    K 4.0 07/18/2023    GLU 86 07/18/2023    CREATININE 0.8 07/18/2023    EGFRNORACEVR >60 07/18/2023    HGBA1C 4.9 07/18/2023    TSH 1.312 07/18/2023      Review of patient's allergies indicates:  No Known Allergies    Encounter Diagnosis   Name Primary?    Acute vaginitis Yes        No orders of the defined types were placed in this encounter.     Medications Ordered This Encounter   Medications    fluconazole (DIFLUCAN) 150 MG Tab     Sig: Take 1 tablet (150 mg total) by mouth once. REFILL AND RE-DOSE IF SYMPTOMS RECUR for 1 dose     Dispense:  1 tablet     Refill:  1        E-Visit Time Tracking:    Day 1 Time (in minutes): 7    Total Time (in minutes): 7      274}

## 2024-09-27 ENCOUNTER — CLINICAL SUPPORT (OUTPATIENT)
Dept: INTERNAL MEDICINE | Facility: CLINIC | Age: 44
End: 2024-09-27
Payer: COMMERCIAL

## 2024-09-27 ENCOUNTER — OFFICE VISIT (OUTPATIENT)
Dept: INTERNAL MEDICINE | Facility: CLINIC | Age: 44
End: 2024-09-27
Payer: COMMERCIAL

## 2024-09-27 ENCOUNTER — HOSPITAL ENCOUNTER (OUTPATIENT)
Dept: CARDIOLOGY | Facility: HOSPITAL | Age: 44
Discharge: HOME | End: 2024-09-27
Attending: FAMILY MEDICINE
Payer: COMMERCIAL

## 2024-09-27 VITALS
BODY MASS INDEX: 31.92 KG/M2 | TEMPERATURE: 97 F | DIASTOLIC BLOOD PRESSURE: 84 MMHG | WEIGHT: 187 LBS | HEIGHT: 64 IN | OXYGEN SATURATION: 98 % | SYSTOLIC BLOOD PRESSURE: 127 MMHG | HEART RATE: 69 BPM | RESPIRATION RATE: 18 BRPM

## 2024-09-27 DIAGNOSIS — Z12.11 SCREENING FOR COLORECTAL CANCER: ICD-10-CM

## 2024-09-27 DIAGNOSIS — Z00.00 ROUTINE GENERAL MEDICAL EXAMINATION AT A HEALTH CARE FACILITY: Primary | ICD-10-CM

## 2024-09-27 DIAGNOSIS — Z00.00 PREVENTATIVE HEALTH CARE: Primary | ICD-10-CM

## 2024-09-27 DIAGNOSIS — Z12.12 SCREENING FOR COLORECTAL CANCER: ICD-10-CM

## 2024-09-27 DIAGNOSIS — Z00.00 ROUTINE GENERAL MEDICAL EXAMINATION AT A HEALTH CARE FACILITY: ICD-10-CM

## 2024-09-27 DIAGNOSIS — E04.1 THYROID NODULE: Chronic | ICD-10-CM

## 2024-09-27 LAB
ALBUMIN SERPL BCP-MCNC: 4.1 G/DL (ref 3.5–5.2)
ALP SERPL-CCNC: 55 U/L (ref 55–135)
ALT SERPL W/O P-5'-P-CCNC: 19 U/L (ref 10–44)
ANION GAP SERPL CALC-SCNC: 10 MMOL/L (ref 8–16)
AST SERPL-CCNC: 16 U/L (ref 10–40)
BILIRUB SERPL-MCNC: 0.6 MG/DL (ref 0.1–1)
BUN SERPL-MCNC: 12 MG/DL (ref 6–20)
CALCIUM SERPL-MCNC: 9.7 MG/DL (ref 8.7–10.5)
CHLORIDE SERPL-SCNC: 105 MMOL/L (ref 95–110)
CHOLEST SERPL-MCNC: 232 MG/DL (ref 120–199)
CHOLEST/HDLC SERPL: 3.7 {RATIO} (ref 2–5)
CO2 SERPL-SCNC: 24 MMOL/L (ref 23–29)
CREAT SERPL-MCNC: 0.8 MG/DL (ref 0.5–1.4)
ERYTHROCYTE [DISTWIDTH] IN BLOOD BY AUTOMATED COUNT: 13 % (ref 11.5–14.5)
EST. GFR  (NO RACE VARIABLE): >60 ML/MIN/1.73 M^2
ESTIMATED AVG GLUCOSE: 94 MG/DL (ref 68–131)
GLUCOSE SERPL-MCNC: 91 MG/DL (ref 70–110)
HBA1C MFR BLD: 4.9 % (ref 4–5.6)
HCT VFR BLD AUTO: 42.4 % (ref 37–48.5)
HDLC SERPL-MCNC: 63 MG/DL (ref 40–75)
HDLC SERPL: 27.2 % (ref 20–50)
HGB BLD-MCNC: 14.2 G/DL (ref 12–16)
LDLC SERPL CALC-MCNC: 151.2 MG/DL (ref 63–159)
MCH RBC QN AUTO: 28.9 PG (ref 27–31)
MCHC RBC AUTO-ENTMCNC: 33.5 G/DL (ref 32–36)
MCV RBC AUTO: 86 FL (ref 82–98)
NONHDLC SERPL-MCNC: 169 MG/DL
OHS QRS DURATION: 88 MS
OHS QTC CALCULATION: 450 MS
PLATELET # BLD AUTO: 258 K/UL (ref 150–450)
PMV BLD AUTO: 9.6 FL (ref 9.2–12.9)
POTASSIUM SERPL-SCNC: 4.3 MMOL/L (ref 3.5–5.1)
PROT SERPL-MCNC: 7.8 G/DL (ref 6–8.4)
RBC # BLD AUTO: 4.91 M/UL (ref 4–5.4)
SODIUM SERPL-SCNC: 139 MMOL/L (ref 136–145)
TRIGL SERPL-MCNC: 89 MG/DL (ref 30–150)
TSH SERPL DL<=0.005 MIU/L-ACNC: 1.29 UIU/ML (ref 0.4–4)
WBC # BLD AUTO: 5.59 K/UL (ref 3.9–12.7)

## 2024-09-27 PROCEDURE — 1159F MED LIST DOCD IN RCRD: CPT | Mod: CPTII,S$GLB,, | Performed by: FAMILY MEDICINE

## 2024-09-27 PROCEDURE — 1160F RVW MEDS BY RX/DR IN RCRD: CPT | Mod: CPTII,S$GLB,, | Performed by: FAMILY MEDICINE

## 2024-09-27 PROCEDURE — 80053 COMPREHEN METABOLIC PANEL: CPT | Performed by: FAMILY MEDICINE

## 2024-09-27 PROCEDURE — 99396 PREV VISIT EST AGE 40-64: CPT | Mod: S$GLB,,, | Performed by: FAMILY MEDICINE

## 2024-09-27 PROCEDURE — 83036 HEMOGLOBIN GLYCOSYLATED A1C: CPT | Performed by: FAMILY MEDICINE

## 2024-09-27 PROCEDURE — 85027 COMPLETE CBC AUTOMATED: CPT | Performed by: FAMILY MEDICINE

## 2024-09-27 PROCEDURE — 93010 ELECTROCARDIOGRAM REPORT: CPT | Mod: ,,, | Performed by: INTERNAL MEDICINE

## 2024-09-27 PROCEDURE — 93005 ELECTROCARDIOGRAM TRACING: CPT

## 2024-09-27 PROCEDURE — 3079F DIAST BP 80-89 MM HG: CPT | Mod: CPTII,S$GLB,, | Performed by: FAMILY MEDICINE

## 2024-09-27 PROCEDURE — 99999 PR PBB SHADOW E&M-EST. PATIENT-LVL IV: CPT | Mod: PBBFAC,,, | Performed by: FAMILY MEDICINE

## 2024-09-27 PROCEDURE — 80061 LIPID PANEL: CPT | Performed by: FAMILY MEDICINE

## 2024-09-27 PROCEDURE — 3074F SYST BP LT 130 MM HG: CPT | Mod: CPTII,S$GLB,, | Performed by: FAMILY MEDICINE

## 2024-09-27 PROCEDURE — 84443 ASSAY THYROID STIM HORMONE: CPT | Performed by: FAMILY MEDICINE

## 2024-09-27 PROCEDURE — 3044F HG A1C LEVEL LT 7.0%: CPT | Mod: CPTII,S$GLB,, | Performed by: FAMILY MEDICINE

## 2024-09-27 PROCEDURE — 3008F BODY MASS INDEX DOCD: CPT | Mod: CPTII,S$GLB,, | Performed by: FAMILY MEDICINE

## 2024-09-27 NOTE — PROGRESS NOTES
"Nutrition Assessment  Session Time:  30 minutes      Client name:  Dominga Contreras  :  1980  Age:  43 y.o.  Gender:  female    Client states:  Present for nutrition counseling as part of [facility] physical.  Last nutrition consult: 2016    Reports experiencing weight gain within the last year despite no change to diet and lifestyle.   States it must be aging and perimenopause.    Job did change 1 year ago and is high stress and more sedentary.    Prescribed Wegovy recently but have not started due to being scared of giving self injection and experiencing possible side effects.   Busy with 3 children and work so unsure when would be the best time to start injection.     Sample foods:  Egg bits   Protein shakes  String cheese  No chips, candy  Lean cuisine for lunch. Or option from  joes.  Red beans  Chicken and rice  Hamburger  Small dessert every day, 100 calorie ice cream     Drinks: coffee with half and half and stevia, water (a ton), coke zero (1 per day)  Wine 3x/week    Exercise: started back in March, elliptical 30 minutes 3 days per week      Anthropometrics  Height:  64"     Weight:  187 lbs  BMI:  32.10  % Body Fat:  n/a    Clinical Signs/Symptoms  N/V/D:  none noted  Appetite:  good       Past Medical History:   Diagnosis Date    History of COVID-19 2021    Nontoxic single thyroid nodule        Past Surgical History:   Procedure Laterality Date    BIOPSY OF THYROID      benign    removal of blood clot       lower back       Medications    has a current medication list which includes the following prescription(s): cetirizine, famotidine, ibuprofen, wegovy, and valacyclovir.    Vitamins, Minerals, and/or Supplements:  not discussed     Food/Medication Interactions:  Reviewed     Food Allergies or Intolerances:  NKFA noted in chart     Social History    Marital status:    Employment:  yes    Social History     Tobacco Use    Smoking status: Never     Passive exposure: " Never    Smokeless tobacco: Never   Substance Use Topics    Alcohol use: Yes     Alcohol/week: 4.0 standard drinks of alcohol     Types: 4 Glasses of wine per week     Comment: occasionally         Lab Reports   Sodium   Date Value Ref Range Status   07/18/2023 139 136 - 145 mmol/L Final     Potassium   Date Value Ref Range Status   07/18/2023 4.0 3.5 - 5.1 mmol/L Final     Chloride   Date Value Ref Range Status   07/18/2023 106 95 - 110 mmol/L Final     CO2   Date Value Ref Range Status   07/18/2023 22 (L) 23 - 29 mmol/L Final     Glucose   Date Value Ref Range Status   07/18/2023 86 70 - 110 mg/dL Final     BUN   Date Value Ref Range Status   07/18/2023 14 6 - 20 mg/dL Final     Creatinine   Date Value Ref Range Status   07/18/2023 0.8 0.5 - 1.4 mg/dL Final     Calcium   Date Value Ref Range Status   07/18/2023 9.4 8.7 - 10.5 mg/dL Final     Total Protein   Date Value Ref Range Status   07/18/2023 7.5 6.0 - 8.4 g/dL Final     Albumin   Date Value Ref Range Status   07/18/2023 4.2 3.5 - 5.2 g/dL Final     Total Bilirubin   Date Value Ref Range Status   07/18/2023 0.5 0.1 - 1.0 mg/dL Final     Comment:     For infants and newborns, interpretation of results should be based  on gestational age, weight and in agreement with clinical  observations.    Premature Infant recommended reference ranges:  Up to 24 hours.............<8.0 mg/dL  Up to 48 hours............<12.0 mg/dL  3-5 days..................<15.0 mg/dL  6-29 days.................<15.0 mg/dL       Alkaline Phosphatase   Date Value Ref Range Status   07/18/2023 40 (L) 55 - 135 U/L Final     AST   Date Value Ref Range Status   07/18/2023 22 10 - 40 U/L Final     ALT   Date Value Ref Range Status   07/18/2023 21 10 - 44 U/L Final     Anion Gap   Date Value Ref Range Status   07/18/2023 11 8 - 16 mmol/L Final     eGFR if    Date Value Ref Range Status   08/25/2021 >60 >60 mL/min/1.73 m^2 Final     eGFR if non    Date Value Ref Range  Status   08/25/2021 >60 >60 mL/min/1.73 m^2 Final     Comment:     Calculation used to obtain the estimated glomerular filtration  rate (eGFR) is the CKD-EPI equation.         Lab Results   Component Value Date    WBC 5.58 07/18/2023    HGB 12.9 07/18/2023    HCT 38.8 07/18/2023    MCV 86 07/18/2023     07/18/2023        Lab Results   Component Value Date    CHOL 214 (H) 07/18/2023     Lab Results   Component Value Date    HDL 66 07/18/2023     Lab Results   Component Value Date    LDLCALC 109.2 07/18/2023     Lab Results   Component Value Date    TRIG 194 (H) 07/18/2023     Lab Results   Component Value Date    CHOLHDL 30.8 07/18/2023     Lab Results   Component Value Date    HGBA1C 4.9 07/18/2023     BP Readings from Last 1 Encounters:   05/08/24 127/70       Food History  reviewed    Exercise History:  reviewed    Cultural/Spiritual/Personal Preferences:  None identified    Support System:  family/friends    State of Change:  Contemplation    Barriers to Change:  none    Diagnosis    obesity related to inadequate/excess energy intake as evidenced by BMI 32, inability to lose weight .    Intervention    RMR (Method:  [place] St. Jeor):  1483 kcal  Activity Factor:  1.3    LYDIA:  1927 - 500 = 1427 kcal    Goals:  1.  Participate in weight training exercises 1-2 days per week  2.  0717-0178 calories per day  3.  Plate method at meals    Nutrition Education  The following education was provided to the patient:  Discussed meal planning/[employer] My Plate design.  Discussed weight management.  Suggested dietary modifications based on current dietary behaviors and individual food preferences.  Discussed nutrition-related lab values and dietary and/or lifestyle factors affecting them.  Discussed [place] client resources (may include but not limited to Eat Fit Shopping List, Fueling Well on the Go, and Meal Planning Guide).    Patient verbalized understanding of nutrition education and recommendations received.    Handouts  Provided  Meal Planning Guide  Eat Fit Shopping List  Fueling Well On-The-Go  Balanced Plate  Materials: Portion Plate    Monitoring/Evaluation    Monitor the following:  Weight  BMI  Caloric intake  Labs:  lipid panel, glucose, HgbA1c    Follow Up Plan:  Communication with referring healthcare provider is unnecessary at this time as patient presented as part of annual wellness exam.  However, will follow up with patient in 1-2 years.

## 2024-09-27 NOTE — PROGRESS NOTES
[facility] ENCOUNTER  9/27/24      REASON FOR VISIT  [facility]    PCP (Primary Care Provider)  Joanna identifies Emely Monique MD as her PCP.    HISTORY  Joanna is here for her [facility] Exam. She reports she is overall doing well.     She reports having a previous polyp found during a partial colonoscopy approximately 15 years ago. She is considering options for colon cancer screening, including colonoscopy and Cologuard test. She expresses concerns about potential false positives with Cologuard, citing experiences of acquaintances who had false positive results leading to unnecessary anxiety. She denies family history of colon cancer.    She reports increasing frequency of acid reflux symptoms. She is currently taking famotidine (Pepcid) as needed for symptom relief. Previously she was using the medication once a week, but now finds herself needing it 3-4 times per week. She takes half of the prescribed dose each time she uses the medication.    She reports a small nodule on the right side of her thyroid, previously biopsied and found to be benign. She had a follow-up ultrasound in July of last year and has an appointment scheduled with her endocrinologist, Dr. Villegas, for further evaluation.    She denies personal history of sleep apnea.    She works at an [employer].       Assessment & Plan     Reviewed comprehensive metabolic panel, complete blood count, and cholesterol panel results - all within normal limits   Assessed cardiovascular risk based on multiple factors including blood pressure, age, sex, smoking status, and diabetes - patient in low risk category, no cholesterol medication needed   Considered colon cancer screening options based on patient's age and risk factors   Evaluated history of polyp found during previous partial colonoscopy, determining full colonoscopy now indicated   Noted patient's increased frequency of antacid use, suggesting daily prophylactic dosing may  be appropriate   Reviewed thyroid nodule follow-up plan, noting benign biopsy results and scheduled endocrinology appointment    K21.9 GASTRO-ESOPHAGEAL REFLUX DISEASE WITHOUT ESOPHAGITIS:   Discussed rationale for daily antacid use when symptoms occur 3 or more times per week.   Increased famotidine (Pepcid) from as-needed to daily use.   The patient reports having to take acid reduction medication (famotidine/Pepcid) more frequently, increasing from once a week to 3-4 times per week.   Evaluated the frequency of medication use and determined it's now needed more often.   Assessed that the patient needs to take the medication daily based on the increased frequency of use.   Recommend taking famotidine/Pepcid daily instead of as needed.    Z86.010 PERSONAL HISTORY OF COLONIC POLYPS:   The patient reports a history of having a polyp found during a partial colonoscopy procedure approximately 15 years ago.   Acknowledged the history of polyp and recommended a full colonoscopy based on this history.   Ordered a full colonoscopy for the patient.    Z12.11 ENCOUNTER FOR SCREENING FOR MALIGNANT NEOPLASM OF COLON:   Explained colon cancer screening options, including colonoscopy and Cologuard test, detailing pros and cons of each method.   Clarified that most positive Cologuard results are due to precancerous polyps rather than cancer.   Ordered a colonoscopy.   Instructed the patient to contact the office by end of Monday if not called about colonoscopy scheduling.   Noted that the patient has just turned 43 and is due for colon cancer screening.   Evaluated patient's risk factors for colon cancer, noting no family history.   Discussed different colon cancer screening options with the patient, including colonoscopy and Cologuard test.    E04.1 NONTOXIC SINGLE THYROID NODULE:   Instructed the patient to follow up with endocrinologist Dr. Villegas on October 14th for thyroid nodule evaluation.   The patient has a history of a  "small nodule on the right side of the thyroid, which has been previously evaluated and monitored.   Performed physical exam and did not palpate the thyroid nodule.    LIFESTYLE CHANGES:   Dominga to continue heart-healthy diet to maintain healthy cholesterol levels.   Review comprehensive explanation of test results via letter, with option to message doctor with any questions.          Review of Systems   Constitutional: Negative.    HENT: Negative.     Eyes: Negative.    Respiratory: Negative.     Cardiovascular: Negative.    Gastrointestinal: Negative.    Endocrine: Negative.    Musculoskeletal: Negative.    Integumentary:  Negative.   Neurological: Negative.    Psychiatric/Behavioral: Negative.         ASSESSMENT/PLAN  1. Preventative health care    2. Screening for colorectal cancer  -     Ambulatory referral/consult to Endo Procedure ; Future; Expected date: 09/28/2024    3. Thyroid nodule (right)  Overview:  Neg bx 2023  [facility]          PHYSICAL EXAM  Vitals:    09/27/24 0949   BP: 127/84   BP Location: Right arm   Patient Position: Sitting   BP Method: Large (Manual)   Pulse: 69   Resp: 18   Temp: 97.3 °F (36.3 °C)   TempSrc: Tympanic   SpO2: 98%   Weight: 84.8 kg (187 lb)   Height: 5' 4" (1.626 m)   Physical Exam  Vitals reviewed.   Constitutional:       General: She is not in acute distress.     Appearance: Normal appearance. She is not ill-appearing, toxic-appearing or diaphoretic.   HENT:      Head: Normocephalic and atraumatic.   Eyes:      General: No scleral icterus.     Conjunctiva/sclera: Conjunctivae normal.   Neck:      Thyroid: Thyroid mass (nodule) present. No thyromegaly or thyroid tenderness.      Vascular: No carotid bruit.   Cardiovascular:      Rate and Rhythm: Normal rate and regular rhythm.      Heart sounds: Normal heart sounds.   Pulmonary:      Effort: Pulmonary effort is normal. No respiratory distress.      Breath sounds: Normal breath sounds. No wheezing or rhonchi. "   Abdominal:      General: Bowel sounds are normal. There is no distension.      Palpations: Abdomen is soft. There is no mass.      Tenderness: There is no abdominal tenderness.   Lymphadenopathy:      Cervical: No cervical adenopathy.   Skin:     General: Skin is warm and dry.      Coloration: Skin is not jaundiced.   Neurological:      General: No focal deficit present.      Mental Status: She is alert and oriented to person, place, and time. Mental status is at baseline.   Psychiatric:         Mood and Affect: Mood normal.         Behavior: Behavior normal.         Judgment: Judgment normal.         MEDICATIONS  Joanna has a current medication list which includes the following prescription(s): famotidine, ibuprofen, wegovy, valacyclovir, and cetirizine.    HEALTH MAINTENANCE AND SCREENINGS - UP TO DATE  Health Maintenance Topics with due status: Not Due       Topic Last Completion Date    Cervical Cancer Screening 03/01/2024    Mammogram 03/01/2024    Hemoglobin A1c (Diabetic Prevention Screening) 09/27/2024    Lipid Panel 09/27/2024    RSV Vaccine (Age 60+ and Pregnant patients) Not Due     HEALTH MAINTENANCE AND SCREENINGS - DUE OR DUE SOON  Health Maintenance Due   Topic Date Due    Pneumococcal Vaccines (Age 0-64) (1 of 2 - PCV) Never done    TETANUS VACCINE  Never done    Colorectal Cancer Screening  Never done    Influenza Vaccine (1) 09/01/2024    COVID-19 Vaccine (1 - 2024-25 season) Never done     FOLLOW-UP  Joanna is to follow up with her PCP, Dr. Monique, for any health problems or concerns, any abnormal test results, and any age-appropriate health maintenance interventions and screenings that may be due.    PROBLEM LIST  Joanna has Sinus arrhythmia; Stable low-risk nodule of left lung, < 4 mm; Thyroid nodule (right); Fever blister; Decreased mobility and endurance; BMI 31.0-31.9,adult; and Degeneration of C5-C6 intervertebral disc on their problem list.    PAST MEDICAL HISTORY  Joanna has a past medical  "history of History of COVID-19 and Nontoxic single thyroid nodule.    SURGICAL HISTORY  Joanna has a past surgical history that includes removal of blood clot  and Biopsy of thyroid.    FAMILY HISTORY  Joanna family history includes Arthritis in her paternal grandmother; Cancer in her father, maternal grandmother, and mother; Diabetes in her father; Heart disease in her maternal grandfather and paternal grandfather; Skin cancer in her father and mother.     ALLERGIES  Joanna reports she has No Known Allergies.    SOCIAL HISTORY  Joanna  reports that she has never smoked. She has never been exposed to tobacco smoke. She has never used smokeless tobacco. She reports current alcohol use of about 4.0 standard drinks of alcohol per week. She reports that she does not use drugs.     This note was generated with the assistance of ambient listening technology. Verbal consent was obtained by the patient and accompanying visitor(s) for the recording of patient appointment to facilitate this note. I attest to having reviewed and edited the generated note for accuracy, though some syntax or spelling errors may persist. Please contact the author of this note for any clarification.    Documentation entered by me for this encounter may have been done in part using speech-recognition technology. Although I have made an effort to ensure accuracy, "sound like" errors may exist and should be interpreted in context.  "

## 2024-09-27 NOTE — LETTER
"Dear ***,    It was a pleasure to see you for your recent Executive Health Exam. Im writing to provide you with a summary of your exam and the results of the tests we performed.    During your visit, we focused on preventive health care, screening for colorectal cancer, and monitoring the thyroid nodule on your right side. I also ordered a colonoscopy to further evaluate colorectal health.    Vital Signs Review:  Your blood pressure at the time of the exam was 127/84. A normal blood pressure is generally considered to be less than 120/80, so yours is slightly above this range but still within a reasonable limit. Your BMI was 32.10, which falls into the "obese" category (a normal BMI ranges from 18.5 to 24.9). Over the past few encounters, we have seen an upward trend in both your weight and BMI. While this indicates some room for improvement, I want to encourage you to continue working towards a healthier weight, as even small changes can have a big impact on your overall health.    Social History Review:  You have a healthy record with no history of tobacco use, which is excellent. Regarding alcohol consumption, you reported having about four glasses of wine per week, which falls within a moderate level. This is generally considered to be safe, but please be mindful of how alcohol can impact overall health.    Social Determinants of Health Review:  Your social health indicators show that you have no financial strain, food insecurity, or transportation needs, which are positive factors for maintaining your well-being. However, you are currently not as physically active as recommended. I encourage you to aim for at least 150 minutes of moderate exercise per week to promote better health.    Your Current Medications:  Your current medications include:  Famotidine (Pepcid) 40 mg  Ibuprofen (Advil, Motrin) 200 mg  Semaglutide, weight loss, (Wegovy) 0.25 mg/0.5 mL  Valacyclovir (Valtrex) 1000 mg  Cetirizine (Zyrtec) 10 " "mg    Lab Results Review:  Here is a summary of your recent lab results:    Comprehensive Metabolic Panel (CMP): This panel evaluates your overall metabolic health, kidney function, and electrolyte balance. Your results were all within the normal ranges, which is a reassuring sign of good health.    Complete Blood Count (CBC): This panel checks different components of your blood. All your results were within the expected ranges, indicating no current issues with your blood health.    Lipid Panel: This panel measures your cholesterol levels and other fats in your blood. Your total cholesterol was slightly elevated at 232, which is above the desired range of 120-199. However, your HDL ("good" cholesterol) was at a healthy level of 63. Your LDL ("bad" cholesterol) was 151.2, which is within an acceptable range but worth monitoring. Lets continue to focus on a heart-healthy diet to help manage these levels.    Thyroid-Stimulating Hormone (TSH): Your TSH level was within the normal range at 1.289, indicating that your thyroid function appears to be stable.    Hemoglobin A1C: This test reflects your average blood sugar levels over the past few months, and your result was 4.9%, which is within the normal range.    Overall, there were no significant abnormalities in your lab results. This is a good indication that your body systems are functioning well, although we should keep an eye on your cholesterol levels.    Historical Lab Trends:   Your cholesterol has shown a slight increase since your last lab, so continuing with healthy lifestyle changes, including diet and exercise, will be beneficial. Other lab values, such as your hemoglobin A1C and thyroid levels, have remained stable, which is great news.    Additional Test Results:   Your recent EKG was normal, indicating that your heart rhythm and electrical activity are in a healthy range.    Health Maintenance:   You are up to date on several important health screenings, " including cervical cancer screening, a mammogram, hemoglobin A1C testing, and a lipid panel. Well done! However, there are a few health maintenance items that are due or due soon, including the pneumococcal vaccine, tetanus vaccine, colorectal cancer screening, influenza vaccine, and the COVID-19 vaccine for the 2024-25 season. This information is based on your current [facility] record, so please follow up with your primary care physician, Dr. Monique, for the most accurate status. If you have already completed any of these, please send the relevant documentation so we can update your records.    Next Steps for Your Health:   To promote your physical and mental health, consider increasing your physical activity to at least 150 minutes of exercise per week. Also, focusing on a balanced diet, including plenty of fruits and vegetables, and reducing saturated fats can help manage your weight and cholesterol.     I trust that you will follow up with your endocrinologist, Dr. Baron, for monitoring of your thyroid nodule and with your primary care physician, Dr. Monique, for any new concerns. Of course, if you have any questions about this Executive Health Exam or need further assistance, you are welcome to message me.    Thank you for trusting me and [facility] with your healthcare.    Warmest regards,     EDU Orr MD

## 2024-10-25 ENCOUNTER — HOSPITAL ENCOUNTER (OUTPATIENT)
Dept: PREADMISSION TESTING | Facility: HOSPITAL | Age: 44
Discharge: HOME | End: 2024-10-25
Attending: FAMILY MEDICINE
Payer: COMMERCIAL

## 2024-10-25 DIAGNOSIS — Z12.11 SCREENING FOR COLORECTAL CANCER: Primary | ICD-10-CM

## 2024-10-25 DIAGNOSIS — Z12.12 SCREENING FOR COLORECTAL CANCER: Primary | ICD-10-CM

## 2024-10-25 RX ORDER — SOD SULF/POT CHLORIDE/MAG SULF 1.479 G
12 TABLET ORAL DAILY
Qty: 24 TABLET | Refills: 0 | Status: SHIPPED | OUTPATIENT
Start: 2024-10-25

## 2024-11-15 ENCOUNTER — HOSPITAL ENCOUNTER (OUTPATIENT)
Dept: RADIOLOGY | Facility: HOSPITAL | Age: 44
Discharge: HOME | End: 2024-11-15
Attending: INTERNAL MEDICINE
Payer: COMMERCIAL

## 2024-11-15 DIAGNOSIS — E04.1 THYROID NODULE: ICD-10-CM

## 2024-11-15 PROCEDURE — 76536 US EXAM OF HEAD AND NECK: CPT | Mod: 26,,, | Performed by: STUDENT IN AN ORGANIZED HEALTH CARE EDUCATION/TRAINING PROGRAM

## 2024-11-15 PROCEDURE — 76536 US EXAM OF HEAD AND NECK: CPT | Mod: TC

## 2024-11-18 ENCOUNTER — ANESTHESIA EVENT (OUTPATIENT)
Dept: ENDOSCOPY | Facility: HOSPITAL | Age: 44
End: 2024-11-18
Payer: COMMERCIAL

## 2024-11-18 NOTE — ANESTHESIA PREPROCEDURE EVALUATION
11/18/2024  Dominga Contreras is a 43 y.o., female.    Past Medical History:   Diagnosis Date    History of COVID-19 08/25/2021    Nontoxic single thyroid nodule      Past Surgical History:   Procedure Laterality Date    BIOPSY OF THYROID      benign    removal of blood clot       lower back     Patient Active Problem List   Diagnosis    Sinus arrhythmia    Stable low-risk nodule of left lung, < 4 mm    Thyroid nodule (right)    Fever blister    Decreased mobility and endurance    BMI 31.0-31.9,adult    Degeneration of C5-C6 intervertebral disc       Normal sinus rhythm with sinus arrhythmia   Normal ECG   When compared with ECG of 18-JUL-2023 08:19,   T wave inversion no longer evident in Anterior leads   T wave amplitude has decreased in Lateral leads   Confirmed by LUCAS CRUZ MD ([id number]) on 9/27/2024     Pre-op Assessment    I have reviewed the Patient Summary Reports.     I have reviewed the Nursing Notes. I have reviewed the NPO Status.   I have reviewed the Medications.     Review of Systems  Anesthesia Hx:  No problems with previous Anesthesia   History of prior surgery of interest to airway management or planning:  Previous anesthesia: General        Denies Family Hx of Anesthesia complications.    Denies Personal Hx of Anesthesia complications.                    Social:  Non-Smoker, Social Alcohol Use       Hematology/Oncology:  Hematology Normal   Oncology Normal                                   EENT/Dental:  EENT/Dental Normal           Cardiovascular:  Cardiovascular Normal                                              Pulmonary:  Pulmonary Normal                       Renal/:  Renal/ Normal                 Hepatic/GI:  Bowel Prep.                   Musculoskeletal:  Arthritis               Neurological:  Neurology Normal                                      Endocrine:        Obesity /  BMI > 30  Dermatological:  Skin Normal    Psych:  Psychiatric Normal                    Physical Exam  General: Alert and Oriented    Airway:  Mallampati: II   Mouth Opening: Normal  TM Distance: Normal  Tongue: Normal  Neck ROM: Normal ROM    Dental:  Intact    Chest/Lungs:  Clear to auscultation, Normal Respiratory Rate    Heart:  Rate: Normal  Rhythm: Regular Rhythm        Anesthesia Plan  Type of Anesthesia, risks & benefits discussed:    Anesthesia Type: Gen Natural Airway  Intra-op Monitoring Plan: Standard ASA Monitors  Post Op Pain Control Plan: multimodal analgesia  Induction:  IV  Informed Consent: Informed consent signed with the Patient and all parties understand the risks and agree with anesthesia plan.  All questions answered.   ASA Score: 2  Day of Surgery Review of History & Physical: H&P Update referred to the surgeon/provider.    Ready For Surgery From Anesthesia Perspective.     .

## 2024-11-20 ENCOUNTER — ANESTHESIA (OUTPATIENT)
Dept: ENDOSCOPY | Facility: HOSPITAL | Age: 44
End: 2024-11-20
Payer: COMMERCIAL

## 2024-11-20 ENCOUNTER — HOSPITAL ENCOUNTER (OUTPATIENT)
Facility: HOSPITAL | Age: 44
Discharge: HOME | End: 2024-11-20
Attending: INTERNAL MEDICINE | Admitting: INTERNAL MEDICINE
Payer: COMMERCIAL

## 2024-11-20 VITALS
OXYGEN SATURATION: 99 % | DIASTOLIC BLOOD PRESSURE: 63 MMHG | BODY MASS INDEX: 31.5 KG/M2 | HEART RATE: 74 BPM | WEIGHT: 184.5 LBS | RESPIRATION RATE: 18 BRPM | TEMPERATURE: 97 F | SYSTOLIC BLOOD PRESSURE: 114 MMHG | HEIGHT: 64 IN

## 2024-11-20 DIAGNOSIS — Z12.11 ENCOUNTER FOR SCREENING COLONOSCOPY: Primary | ICD-10-CM

## 2024-11-20 PROBLEM — Z86.0100 PERSONAL HISTORY OF COLON POLYPS, UNSPECIFIED: Status: ACTIVE | Noted: 2021-08-23

## 2024-11-20 LAB
B-HCG UR QL: NEGATIVE
CTP QC/QA: YES

## 2024-11-20 PROCEDURE — 37000008 HC ANESTHESIA 1ST 15 MINUTES: Performed by: INTERNAL MEDICINE

## 2024-11-20 PROCEDURE — 63600175 PHARM REV CODE 636 W HCPCS: Performed by: NURSE ANESTHETIST, CERTIFIED REGISTERED

## 2024-11-20 PROCEDURE — 45380 COLONOSCOPY AND BIOPSY: CPT | Mod: 33,,, | Performed by: INTERNAL MEDICINE

## 2024-11-20 PROCEDURE — 25000003 PHARM REV CODE 250: Performed by: NURSE ANESTHETIST, CERTIFIED REGISTERED

## 2024-11-20 PROCEDURE — 88305 TISSUE EXAM BY PATHOLOGIST: CPT | Performed by: STUDENT IN AN ORGANIZED HEALTH CARE EDUCATION/TRAINING PROGRAM

## 2024-11-20 PROCEDURE — 37000009 HC ANESTHESIA EA ADD 15 MINS: Performed by: INTERNAL MEDICINE

## 2024-11-20 PROCEDURE — 81025 URINE PREGNANCY TEST: CPT | Performed by: INTERNAL MEDICINE

## 2024-11-20 PROCEDURE — 45380 COLONOSCOPY AND BIOPSY: CPT | Mod: 33 | Performed by: INTERNAL MEDICINE

## 2024-11-20 PROCEDURE — 27201012 HC FORCEPS, HOT/COLD, DISP: Performed by: INTERNAL MEDICINE

## 2024-11-20 RX ORDER — LIDOCAINE HYDROCHLORIDE 20 MG/ML
INJECTION INTRAVENOUS
Status: DISCONTINUED | OUTPATIENT
Start: 2024-11-20 | End: 2024-11-20

## 2024-11-20 RX ORDER — PROPOFOL 10 MG/ML
INJECTION, EMULSION INTRAVENOUS
Status: DISCONTINUED | OUTPATIENT
Start: 2024-11-20 | End: 2024-11-20

## 2024-11-20 RX ORDER — ONDANSETRON 4 MG/1
4 TABLET, ORALLY DISINTEGRATING ORAL 2 TIMES DAILY
COMMUNITY

## 2024-11-20 RX ADMIN — PROPOFOL 20 MG: 10 INJECTION, EMULSION INTRAVENOUS at 07:11

## 2024-11-20 RX ADMIN — PROPOFOL 50 MG: 10 INJECTION, EMULSION INTRAVENOUS at 07:11

## 2024-11-20 RX ADMIN — LIDOCAINE HYDROCHLORIDE 40 MG: 20 INJECTION INTRAVENOUS at 07:11

## 2024-11-20 RX ADMIN — PROPOFOL 40 MG: 10 INJECTION, EMULSION INTRAVENOUS at 07:11

## 2024-11-20 RX ADMIN — PROPOFOL 30 MG: 10 INJECTION, EMULSION INTRAVENOUS at 07:11

## 2024-11-20 RX ADMIN — SODIUM CHLORIDE: 9 INJECTION, SOLUTION INTRAVENOUS at 07:11

## 2024-11-20 NOTE — H&P
Short Stay Endoscopy History and Physical    PCP - Emely Monique MD    Procedure - Colonoscopy  ASA - per anesthesia  Mallampati - per anesthesia  History of Anesthesia problems - no  Family history Anesthesia problems -  no     HPI:  This is a 43 y.o. female here for evaluation of :   Active Hospital Problems    Diagnosis  POA    *Personal history of colon polyps, unspecified [Z86.0100]  Not Applicable      Resolved Hospital Problems   No resolved problems to display.         Health Maintenance         Date Due Completion Date    Pneumococcal Vaccines (Age 0-64) (1 of 2 - PCV) Never done ---    TETANUS VACCINE Never done ---    Colorectal Cancer Screening Never done ---    Influenza Vaccine (1) 09/01/2024 12/7/2022 (Done)    Override on 12/7/2022: Done    Override on 12/7/2022: Declined    COVID-19 Vaccine (1 - 2024-25 season) Never done ---    Mammogram 03/01/2025 3/1/2024    Hemoglobin A1c (Diabetic Prevention Screening) 09/27/2027 9/27/2024    Cervical Cancer Screening 03/01/2029 3/1/2024    Override on 1/5/2016: Done (abnormal pap- Dr Kami Tillman , negative HPV)    Lipid Panel 09/27/2029 9/27/2024    RSV Vaccine (Age 60+ and Pregnant patients) (1 - 1-dose 75+ series) 12/05/2053 ---              ROS:  CONSTITUTIONAL: Denies weight change,  fatigue, fevers, chills, night sweats.  CARDIOVASCULAR: Denies chest pain, shortness of breath, orthopnea and edema.  RESPIRATORY: Denies cough, hemoptysis, dyspnea, and wheezing.  GI: See HPI.    Medical History:   Past Medical History:   Diagnosis Date    History of COVID-19 08/25/2021    Nontoxic single thyroid nodule        Surgical History:   Past Surgical History:   Procedure Laterality Date    BIOPSY OF THYROID      benign    removal of blood clot       lower back       Family History:   Family History   Problem Relation Name Age of Onset    Skin cancer Mother Neha Gamez     Cancer Mother Neha Gamez         Skin    Diabetes Father Shane Guzman      Skin cancer Father Shane Guzman     Cancer Father Shane Guzman         Skin    Heart disease Maternal Grandfather LYN Guzman          of a heart attack in his 70's    Heart disease Paternal Grandfather Mr Olivarez          of a heart attack in his 50's    Cancer Maternal Grandmother Drea Olivarez         Lung    Arthritis Paternal Grandmother Yecenia Guzman        Social History:   Social History     Tobacco Use    Smoking status: Never     Passive exposure: Never    Smokeless tobacco: Never   Substance Use Topics    Alcohol use: Yes     Alcohol/week: 4.0 standard drinks of alcohol     Types: 4 Glasses of wine per week     Comment: occasionally     Drug use: No       Allergies:   Review of patient's allergies indicates:  No Known Allergies    Medications:   No current facility-administered medications on file prior to encounter.     Current Outpatient Medications on File Prior to Encounter   Medication Sig Dispense Refill    famotidine (PEPCID) 40 MG tablet Take 1 tablet (40 mg total) by mouth 2 (two) times daily as needed for Heartburn. 90 tablet 0    ibuprofen (ADVIL,MOTRIN) 200 MG tablet Take 200 mg by mouth every 6 (six) hours as needed for Pain.      L.acid/L.casei/B.bif/B.gael/FOS (PROBIOTIC BLEND ORAL) Take by mouth once daily.      ondansetron (ZOFRAN-ODT) 4 MG TbDL Take 4 mg by mouth 2 (two) times daily.      sod sulf-pot chloride-mag sulf (SUTAB) 1.479-0.188- 0.225 gram tablet Take 12 tablets by mouth once daily. Take according to package instructions with indicated amount of water. 24 tablet 0    ZINC ORAL Take by mouth once daily.      cetirizine (ZYRTEC) 10 MG tablet Take 1 tablet (10 mg total) by mouth once daily. for 10 days 10 tablet 0    semaglutide, weight loss, (WEGOVY) 0.25 mg/0.5 mL PnIj Inject 0.25 mg into the skin every 7 days. 2 mL 1    valACYclovir (VALTREX) 1000 MG tablet Take 2 tablets (2,000 mg total) by mouth 2 (two) times daily. For flareups 12 tablet 3       Physical Exam:  Vital Signs:    Vitals:    11/20/24 0639   BP: 131/84   Pulse: 100   Resp: 16   Temp: 97 °F (36.1 °C)     General Appearance: Well appearing in no acute distress  ENT: OP clear  Chest: CTA B  CV: RRR, no m/r/g  Abd: s/nt/nd/nabs  Ext: no edema    Labs:Reviewed    IMP:  Active Hospital Problems    Diagnosis  POA    *Personal history of colon polyps, unspecified [Z86.0100]  Not Applicable      Resolved Hospital Problems   No resolved problems to display.         Plan:   I have explained the risks and benefits of colonoscopy to the patient including but not limited to bleeding, perforation, infection, and death. The patient wishes to proceed.

## 2024-11-20 NOTE — TRANSFER OF CARE
"Anesthesia Transfer of Care Note    Patient: Dominga Contreras    Procedure(s) Performed: Procedure(s) (LRB):  COLONOSCOPY, SCREENING, LOW RISK PATIENT (N/A)    Patient location: GI    Anesthesia Type: MAC    Transport from OR: Transported from OR on room air with adequate spontaneous ventilation    Post pain: adequate analgesia    Post assessment: no apparent anesthetic complications    Post vital signs: stable    Level of consciousness: responds to stimulation    Nausea/Vomiting: no nausea/vomiting    Complications: none    Transfer of care protocol was followed      Last vitals: Visit Vitals  /84 (BP Location: Right arm, Patient Position: Sitting)   Pulse 100   Temp 36.1 °C (97 °F) (Temporal)   Resp 16   Ht 5' 4" (1.626 m)   Wt 83.7 kg (184 lb 8.4 oz)   SpO2 97%   Breastfeeding No   BMI 31.67 kg/m²     "

## 2024-11-20 NOTE — ANESTHESIA POSTPROCEDURE EVALUATION
Anesthesia Post Evaluation    Patient: Dominga Contreras    Procedure(s) Performed: Procedure(s) (LRB):  COLONOSCOPY, SCREENING, LOW RISK PATIENT (N/A)    Final Anesthesia Type: general      Patient location during evaluation: PACU  Patient participation: Yes- Able to Participate  Level of consciousness: awake and alert and oriented  Post-procedure vital signs: reviewed and stable  Pain management: adequate  Airway patency: patent    PONV status at discharge: No PONV  Anesthetic complications: no      Cardiovascular status: blood pressure returned to baseline, stable and hemodynamically stable  Respiratory status: unassisted  Hydration status: euvolemic  Follow-up not needed.              Vitals Value Taken Time   /63 11/20/24 0803     11/20/24 0806   Pulse 75 11/20/24 0806   Resp 22 11/20/24 0806   SpO2 100 % 11/20/24 0806   Vitals shown include unfiled device data.      Event Time   Out of Recovery 08:17:00         Pain/Na Score: Na Score: 10 (11/20/2024  7:57 AM)

## 2024-11-20 NOTE — [employer] PATIENT INSTRUCTIONS
Discharge Summary/Instructions after an Endoscopic Procedure  Patient Name: Dominga Contreras  Patient MRN: [id number]  Patient YOB: 1980 Wednesday, November 20, 2024  Ruth Patterson MD  Dear patient,  As a result of recent federal legislation (The Federal Cures Act), you may   receive lab or pathology results from your procedure in your [facility]   account before your physician is able to contact you. Your physician or   their representative will relay the results to you with their   recommendations at their soonest availability.  Thank you,  RESTRICTIONS:  During your procedure today, you received medications for sedation.  These   medications may affect your judgment, balance and coordination.  Therefore,   for 24 hours, you have the following restrictions:   - DO NOT drive a car, operate machinery, make legal/financial decisions,   sign important papers or drink alcohol.    ACTIVITY:  Today: no heavy lifting, straining or running due to procedural   sedation/anesthesia.  The following day: return to full activity including work.  DIET:  Eat and drink normally unless instructed otherwise.     TREATMENT FOR COMMON SIDE EFFECTS:  - Mild abdominal pain, nausea, belching, bloating or excessive gas:  rest,   eat lightly and use a heating pad.  - Sore Throat: treat with throat lozenges and/or gargle with warm salt   water.  - Because air was used during the procedure, expelling large amounts of air   from your rectum or belching is normal.  - If a bowel prep was taken, you may not have a bowel movement for 1-3 days.    This is normal.  SYMPTOMS TO WATCH FOR AND REPORT TO YOUR PHYSICIAN:  1. Abdominal pain or bloating, other than gas cramps.  2. Chest pain.  3. Back pain.  4. Signs of infection such as: chills or fever occurring within 24 hours   after the procedure.  5. Rectal bleeding, which would show as bright red, maroon, or black stools.   (A tablespoon of blood from the rectum is not serious,  especially if   hemorrhoids are present.)  6. Vomiting.  7. Weakness or dizziness.  GO DIRECTLY TO THE NEAREST EMERGENCY ROOM IF YOU HAVE ANY OF THE FOLLOWING:      Difficulty breathing              Chills and/or fever over 101 F   Persistent vomiting and/or vomiting blood   Severe abdominal pain   Severe chest pain   Black, tarry stools   Bleeding- more than one tablespoon   Any other symptom or condition that you feel may need urgent attention  Your doctor recommends these additional instructions:  If any biopsies were taken, your doctors clinic will contact you in 1 to 2   weeks with any results.  - Discharge patient to home (via wheelchair).   - Resume previous diet.   - Continue present medications.   - Patient has a contact number available for emergencies.  The signs and   symptoms of potential delayed complications were discussed with the   patient.  Return to normal activities tomorrow.  Written discharge   instructions were provided to the patient.   - Repeat colonoscopy in 5 years for surveillance.  For questions, problems or results please call your physician Ruth Patterson MD at Work:  [phone]  If you have any questions about the above instructions, call the GI   department at [phone] or call the endoscopy unit at [phone]   from 7am until 3 pm.  [facility] [place], EMERGENCY ROOM PHONE NUMBER:   [phone]  IF A COMPLICATION OR EMERGENCY SITUATION ARISES AND YOU ARE UNABLE TO REACH   YOUR PHYSICIAN - GO DIRECTLY TO THE EMERGENCY ROOM.  I have read or have had read to me these discharge instructions for my   procedure and have received a written copy.  I understand these   instructions and will follow-up with my physician if I have any questions.     __________________________________       _____________________________________  Nurse Signature                                          Patient/Designated   Responsible Party Signature  Ruth Patterson  MD Ruth Patterson MD  11/20/2024 7:49:06 AM  [employer]

## 2024-11-20 NOTE — PLAN OF CARE
Discharge instructions reviewed with pt and spouse, handouts given, verbalized understanding with no further questions at this time. Dr. Patterson spoke to pt at bedside, reviewed procedure and answered questions aware they are awaiting biopsy results with MD telephone number provided per AVS sheet. VSS on RA, no pain or nausea noted, tolerating po fluids without difficulty, no other complaints noted. Fall precautions reviewed, consents in chart.

## 2024-11-22 LAB
FINAL PATHOLOGIC DIAGNOSIS: NORMAL
GROSS: NORMAL
Lab: NORMAL

## 2025-02-09 DIAGNOSIS — K21.9 GASTROESOPHAGEAL REFLUX DISEASE, UNSPECIFIED WHETHER ESOPHAGITIS PRESENT: ICD-10-CM

## 2025-02-10 RX ORDER — FAMOTIDINE 40 MG/1
40 TABLET, FILM COATED ORAL 2 TIMES DAILY PRN
Qty: 90 TABLET | Refills: 0 | Status: SHIPPED | OUTPATIENT
Start: 2025-02-10 | End: 2026-02-10

## 2025-03-29 ENCOUNTER — PATIENT MESSAGE (OUTPATIENT)
Dept: PRIMARY CARE CLINIC | Facility: CLINIC | Age: 45
End: 2025-03-29
Payer: COMMERCIAL

## 2025-03-31 RX ORDER — ONDANSETRON 4 MG/1
4 TABLET, ORALLY DISINTEGRATING ORAL EVERY 8 HOURS PRN
Qty: 30 TABLET | Refills: 2 | Status: SHIPPED | OUTPATIENT
Start: 2025-03-31

## 2025-03-31 NOTE — TELEPHONE ENCOUNTER
Dominga Contreras,     I have sent the following medications to your preferred pharmacy on file. Please let me know if you have further questions.     Medications Ordered This Encounter   Medications    ondansetron (ZOFRAN-ODT) 4 MG TbDL     Sig: Take 1 tablet (4 mg total) by mouth every 8 (eight) hours as needed (nausea).     Dispense:  30 tablet     Refill:  2          [facility], [facility] [address] [address]  [id number] [place] [address]  Phone: [phone] Fax: [phone]       Sincerely,   Emely Monique MD

## 2025-04-28 DIAGNOSIS — K21.9 GASTROESOPHAGEAL REFLUX DISEASE, UNSPECIFIED WHETHER ESOPHAGITIS PRESENT: ICD-10-CM

## 2025-04-29 RX ORDER — FAMOTIDINE 40 MG/1
40 TABLET, FILM COATED ORAL 2 TIMES DAILY PRN
Qty: 90 TABLET | Refills: 0 | Status: SHIPPED | OUTPATIENT
Start: 2025-04-29 | End: 2026-04-29

## 2025-05-06 DIAGNOSIS — Z00.00 ROUTINE GENERAL MEDICAL EXAMINATION AT A HEALTH CARE FACILITY: Primary | ICD-10-CM

## 2025-06-08 ENCOUNTER — E-VISIT (OUTPATIENT)
Dept: PRIMARY CARE CLINIC | Facility: CLINIC | Age: 45
End: 2025-06-08
Payer: COMMERCIAL

## 2025-06-08 DIAGNOSIS — E66.811 CLASS 1 OBESITY WITHOUT SERIOUS COMORBIDITY WITH BODY MASS INDEX (BMI) OF 31.0 TO 31.9 IN ADULT, UNSPECIFIED OBESITY TYPE: Primary | ICD-10-CM

## 2025-06-09 VITALS — WEIGHT: 181 LBS | BODY MASS INDEX: 30.9 KG/M2 | HEIGHT: 64 IN

## 2025-06-09 RX ORDER — SEMAGLUTIDE 0.5 MG/.5ML
0.5 INJECTION, SOLUTION SUBCUTANEOUS
Qty: 2 ML | Refills: 1 | Status: SHIPPED | OUTPATIENT
Start: 2025-06-09

## 2025-06-09 NOTE — PROGRESS NOTES
Patient ID: Domigna Contreras is a 44 y.o. female.    Chief Complaint: General Illness (Entered automatically based on patient selection in [employer].)    The patient initiated a request through [employer] on 6/8/2025 for evaluation and management with a chief complaint of General Illness (Entered automatically based on patient selection in [employer].)     I evaluated the questionnaire submission on 06/09/2025.    [facility]-Medication    6/8/2025  1:25 PM CDT - Filed by Patient   What do you need help with? Medication Management   Do you agree to participate in an E-Visit? Yes   If you have any of the following symptoms, please present to your local emergency room or call 911:  I acknowledge   Medication requests for narcotics will not be addressed via an E-Visit.  Please schedule an appointment. I acknowledge   Do you have any of the following pregnancy-related conditions? None   Do you want to address a new or existing medication? I would like to address a medication I currently take   What is the main issue you would like addressed today? Currently on .25 Wegovy and its going well. But, its starting to wear off quickly, wondering if we can bump up to .50.   Would you like to change or continue your medication? Continue medication   What medication would you like to continue?  Wegovy   Are you taking it as prescribed? Yes    What medical condition is the  medication intended to treat? Weight Loss   Is the medication helping your condition? Yes   Are you having any side effects from the medication? No   Provide any additional information you feel is important. NA   Please attach any relevant images or files    Are you able to take your vital signs? No         Active Problem List with Overview Notes    Diagnosis Date Noted    BMI 31.0-31.9,adult 05/23/2024    Degeneration of C5-C6 intervertebral disc 05/23/2024    Decreased mobility and endurance 02/29/2024    Fever blister 12/07/2022    Thyroid nodule  (right) 09/30/2022     Neg bx 2023  Sees [place]      Sinus arrhythmia 08/25/2021    Stable low-risk nodule of left lung, < 4 mm 08/25/2021    Personal history of colon polyps, unspecified 08/23/2021      Recent Labs Obtained:  No visits with results within 7 Day(s) from this visit.   Latest known visit with results is:   Office Visit on 03/07/2025   Component Date Value Ref Range Status    DIAGNOSIS: 03/07/2025 Comment   Final    NEGATIVE FOR INTRAEPITHELIAL LESION OR MALIGNANCY.    Specimen adequacy: 03/07/2025 Comment   Final    Comment: Satisfactory for evaluation.  Endocervical and/or squamous metaplastic  cells (endocervical component) are present.      Clinician Provided ICD10 03/07/2025 Comment   Final    Z01.419    Performed by: 03/07/2025 Comment   Final    Belgica Lerma, Cytotechnologist (ASCP)    . 03/07/2025 .   Final    Note: 03/07/2025 Comment   Final    Comment: The Pap smear is a screening test designed to aid in the detection of  premalignant and malignant conditions of the uterine cervix.  It is not a  diagnostic procedure and should not be used as the sole means of detecting  cervical cancer.  Both false-positive and false-negative reports do occur.      Test Methodology: 03/07/2025 Comment   Final    Comment: This liquid based ThinPrep(R) pap test was interpreted using the  Sil[employer](R) Genius(TM) Cervical AI Algorithm whole slide imaging  system.      . 03/07/2025 Comment   Final    Comment: The HPV DNA reflex criteria were not met with this specimen result  therefore, no HPV testing was performed.         Encounter Diagnosis   Name Primary?    Class 1 obesity without serious comorbidity with body mass index (BMI) of 31.0 to 31.9 in adult, unspecified obesity type Yes        No orders of the defined types were placed in this encounter.     Medications Ordered This Encounter   Medications    semaglutide, weight loss, (WEGOVY) 0.5 mg/0.5 mL PnIj     Sig: Inject 0.5 mg into the skin every 7  "days.     Dispense:  2 mL     Refill:  1     Increasing dose from 0.25mg weekly to 0.5mg weekly.      Dominga Kothari" was seen today for general illness.    Diagnoses and all orders for this visit:    Class 1 obesity without serious comorbidity with body mass index (BMI) of 31.0 to 31.9 in adult, unspecified obesity type  -     semaglutide, weight loss, (WEGOVY) 0.5 mg/0.5 mL PnIj; Inject 0.5 mg into the skin every 7 days.     The patient has attempted to lose weight through a healthcare provider supervised comprehensive lifestyle program, which includes reducing caloric intake by about 30% per day, and completing at least 150 minutes of exercise per week. The patient has been unable to achieve a 5% weight reduction with calorie deficit goals, exercise goals and behavior therapy for at least 6 months prior to requesting drug therapy for GLP-1 medication.   Follow up in about 3 months (around 9/8/2025) for f/u VV Dr. Monique.      E-Visit Time Tracking:    Day 1 Time (in minutes): 11    Total Time (in minutes): 11              Patient Instructions   Dominga Marshall Ben        Thank you for using the e-visit platform to address your medication concerns.  Please see the below information in regards to your medication, refills and follow-up.      I increased the dose to the 0.5mg weekly shot. It does have 2 refills on it. If you find that after 1 month you are wanting to increase to the 1mg dose, let me know and we can do a video visit or another e-visit for insurance purposes. Try to work on incorporating at least 150min of exercise per week ( split up across the various days) and reducing your caloric intake by about 30% per day ( prior to starting the diet/health plan). Goal for you would to be around 1200 calories a day.     Medications Ordered This Encounter   Medications    semaglutide, weight loss, (WEGOVY) 0.5 mg/0.5 mL PnIj     Sig: Inject 0.5 mg into the skin every 7 days.     Dispense:  2 mL     Refill:  1     " Increasing dose from 0.25mg weekly to 0.5mg weekly.        [facility]  [address]  [place] [address]  Phone: [phone] Fax: [phone]       Please let me know if you have further questions. Thank you for allowing me to care for you!     Sincerely,   Emely Monique MD

## 2025-06-09 NOTE — PATIENT INSTRUCTIONS
Dominga Contreras        Thank you for using the e-visit platform to address your medication concerns.  Please see the below information in regards to your medication, refills and follow-up.      I increased the dose to the 0.5mg weekly shot. It does have 2 refills on it. If you find that after 1 month you are wanting to increase to the 1mg dose, let me know and we can do a video visit or another e-visit for insurance purposes. Try to work on incorporating at least 150min of exercise per week ( split up across the various days) and reducing your caloric intake by about 30% per day ( prior to starting the diet/health plan). Goal for you would to be around 1200 calories a day.     Medications Ordered This Encounter   Medications    semaglutide, weight loss, (WEGOVY) 0.5 mg/0.5 mL PnIj     Sig: Inject 0.5 mg into the skin every 7 days.     Dispense:  2 mL     Refill:  1     Increasing dose from 0.25mg weekly to 0.5mg weekly.        [facility] [address] [address]  Phone: [phone] Fax: [phone]       Please let me know if you have further questions. Thank you for allowing me to care for you!     Sincerely,   Emely Monique MD

## 2025-06-13 DIAGNOSIS — K21.9 GASTROESOPHAGEAL REFLUX DISEASE, UNSPECIFIED WHETHER ESOPHAGITIS PRESENT: ICD-10-CM

## 2025-06-13 RX ORDER — FAMOTIDINE 40 MG/1
40 TABLET, FILM COATED ORAL 2 TIMES DAILY PRN
Qty: 90 TABLET | Refills: 0 | Status: SHIPPED | OUTPATIENT
Start: 2025-06-13 | End: 2026-06-13

## 2025-06-13 RX ORDER — ONDANSETRON 4 MG/1
4 TABLET, ORALLY DISINTEGRATING ORAL EVERY 8 HOURS PRN
Qty: 30 TABLET | Refills: 2 | Status: SHIPPED | OUTPATIENT
Start: 2025-06-13

## 2025-06-13 NOTE — TELEPHONE ENCOUNTER
Care Due:                  Date            Visit Type   Department     Provider  --------------------------------------------------------------------------------                                ESTABLISHED                              PATIENT -    [facility] PRIMARY  Last Visit: 05-      [facility]           Emely Vasquez Eneida  Next Visit: None Scheduled  None         None Found                                                            Last  Test          Frequency    Reason                     Performed    Due Date  --------------------------------------------------------------------------------    Office Visit  15 months..  semaglutide,,              05- 08-                             valACYclovir.............    HBA1C.......  6 months...  semaglutide,.............  09- 03-    [facility] Embedded Care Due Messages. Reference number: [id number].   6/13/2025 10:16:14 AM CDT

## 2025-06-30 ENCOUNTER — PATIENT MESSAGE (OUTPATIENT)
Dept: PRIMARY CARE CLINIC | Facility: CLINIC | Age: 45
End: 2025-06-30
Payer: COMMERCIAL

## 2025-08-15 ENCOUNTER — OFFICE VISIT (OUTPATIENT)
Dept: INTERNAL MEDICINE | Facility: CLINIC | Age: 45
End: 2025-08-15
Attending: FAMILY MEDICINE
Payer: COMMERCIAL

## 2025-08-15 ENCOUNTER — HOSPITAL ENCOUNTER (OUTPATIENT)
Dept: CARDIOLOGY | Facility: HOSPITAL | Age: 45
Discharge: HOME | End: 2025-08-15
Attending: FAMILY MEDICINE
Payer: COMMERCIAL

## 2025-08-15 VITALS
WEIGHT: 171 LBS | HEIGHT: 64 IN | BODY MASS INDEX: 29.19 KG/M2 | TEMPERATURE: 97 F | SYSTOLIC BLOOD PRESSURE: 133 MMHG | HEART RATE: 76 BPM | DIASTOLIC BLOOD PRESSURE: 87 MMHG | OXYGEN SATURATION: 99 %

## 2025-08-15 DIAGNOSIS — Z00.00 ROUTINE GENERAL MEDICAL EXAMINATION AT A HEALTH CARE FACILITY: ICD-10-CM

## 2025-08-15 DIAGNOSIS — K21.9 GASTROESOPHAGEAL REFLUX DISEASE, UNSPECIFIED WHETHER ESOPHAGITIS PRESENT: ICD-10-CM

## 2025-08-15 DIAGNOSIS — E04.1 THYROID NODULE: Chronic | ICD-10-CM

## 2025-08-15 DIAGNOSIS — Z00.00 ROUTINE GENERAL MEDICAL EXAMINATION AT A HEALTH CARE FACILITY: Primary | ICD-10-CM

## 2025-08-15 DIAGNOSIS — Z00.00 WELLNESS EXAMINATION: Primary | ICD-10-CM

## 2025-08-15 DIAGNOSIS — E66.3 OVERWEIGHT (BMI 25.0-29.9): Chronic | ICD-10-CM

## 2025-08-15 LAB
ALBUMIN SERPL BCP-MCNC: 4.9 G/DL (ref 3.5–5.2)
ALP SERPL-CCNC: 48 UNIT/L (ref 40–150)
ALT SERPL W/O P-5'-P-CCNC: 16 UNIT/L (ref 10–44)
ANION GAP (OHS): 10 MMOL/L (ref 8–16)
AST SERPL-CCNC: 18 UNIT/L (ref 11–45)
BILIRUB SERPL-MCNC: 0.5 MG/DL (ref 0.1–1)
BUN SERPL-MCNC: 11 MG/DL (ref 6–20)
CALCIUM SERPL-MCNC: 9.2 MG/DL (ref 8.7–10.5)
CHLORIDE SERPL-SCNC: 106 MMOL/L (ref 95–110)
CHOLEST SERPL-MCNC: 224 MG/DL (ref 120–199)
CHOLEST/HDLC SERPL: 4.1 {RATIO} (ref 2–5)
CO2 SERPL-SCNC: 22 MMOL/L (ref 23–29)
CREAT SERPL-MCNC: 0.8 MG/DL (ref 0.5–1.4)
CV STRESS BASE HR: 96 BPM
DIASTOLIC BLOOD PRESSURE: 60 MMHG
EAG (OHS): 100 MG/DL (ref 68–131)
ERYTHROCYTE [DISTWIDTH] IN BLOOD BY AUTOMATED COUNT: 13.2 % (ref 11.5–14.5)
GFR SERPLBLD CREATININE-BSD FMLA CKD-EPI: >60 ML/MIN/1.73/M2
GLUCOSE SERPL-MCNC: 99 MG/DL (ref 70–110)
HBA1C MFR BLD: 5.1 % (ref 4–5.6)
HCT VFR BLD AUTO: 44.1 % (ref 37–48.5)
HDLC SERPL-MCNC: 54 MG/DL (ref 40–75)
HDLC SERPL: 24.1 % (ref 20–50)
HGB BLD-MCNC: 14.8 GM/DL (ref 12–16)
LDLC SERPL CALC-MCNC: 153.4 MG/DL (ref 63–159)
MCH RBC QN AUTO: 28.6 PG (ref 27–31)
MCHC RBC AUTO-ENTMCNC: 33.6 G/DL (ref 32–36)
MCV RBC AUTO: 85 FL (ref 82–98)
NONHDLC SERPL-MCNC: 170 MG/DL
OHS CV CPX 85 PERCENT MAX PREDICTED HEART RATE MALE: 148
OHS CV CPX ESTIMATED METS: 9
OHS CV CPX MAX PREDICTED HEART RATE: 174
OHS CV CPX PATIENT IS FEMALE: 1
OHS CV CPX PATIENT IS MALE: 0
OHS CV CPX PEAK DIASTOLIC BLOOD PRESSURE: 78 MMHG
OHS CV CPX PEAK HEAR RATE: 169 BPM
OHS CV CPX PEAK RATE PRESSURE PRODUCT: NORMAL
OHS CV CPX PEAK SYSTOLIC BLOOD PRESSURE: 210 MMHG
OHS CV CPX PERCENT MAX PREDICTED HEART RATE ACHIEVED: 102
OHS CV CPX RATE PRESSURE PRODUCT PRESENTING: NORMAL
PLATELET # BLD AUTO: 266 K/UL (ref 150–450)
PMV BLD AUTO: 9.7 FL (ref 9.2–12.9)
POTASSIUM SERPL-SCNC: 4.4 MMOL/L (ref 3.5–5.1)
PROT SERPL-MCNC: 8.2 GM/DL (ref 6–8.4)
RBC # BLD AUTO: 5.18 M/UL (ref 4–5.4)
SODIUM SERPL-SCNC: 138 MMOL/L (ref 136–145)
STRESS ECHO POST EXERCISE DUR MIN: 7 MINUTES
STRESS ECHO POST EXERCISE DUR SEC: 35 SECONDS
STRESS ST DEPRESSION: 0.5 MM
SYSTOLIC BLOOD PRESSURE: 126 MMHG
TRIGL SERPL-MCNC: 83 MG/DL (ref 30–150)
TSH SERPL-ACNC: 0.89 UIU/ML (ref 0.4–4)
WBC # BLD AUTO: 5.07 K/UL (ref 3.9–12.7)

## 2025-08-15 PROCEDURE — 3044F HG A1C LEVEL LT 7.0%: CPT | Mod: CPTII,S$GLB,, | Performed by: FAMILY MEDICINE

## 2025-08-15 PROCEDURE — 3075F SYST BP GE 130 - 139MM HG: CPT | Mod: CPTII,S$GLB,, | Performed by: FAMILY MEDICINE

## 2025-08-15 PROCEDURE — 93018 CV STRESS TEST I&R ONLY: CPT | Mod: ,,, | Performed by: INTERNAL MEDICINE

## 2025-08-15 PROCEDURE — 3008F BODY MASS INDEX DOCD: CPT | Mod: CPTII,S$GLB,, | Performed by: FAMILY MEDICINE

## 2025-08-15 PROCEDURE — 93017 CV STRESS TEST TRACING ONLY: CPT

## 2025-08-15 PROCEDURE — 99999 PR PBB SHADOW E&M-EST. PATIENT-LVL III: CPT | Mod: PBBFAC,,, | Performed by: FAMILY MEDICINE

## 2025-08-15 PROCEDURE — 99396 PREV VISIT EST AGE 40-64: CPT | Mod: S$PBB,S$GLB,, | Performed by: FAMILY MEDICINE

## 2025-08-15 PROCEDURE — 1160F RVW MEDS BY RX/DR IN RCRD: CPT | Mod: CPTII,S$GLB,, | Performed by: FAMILY MEDICINE

## 2025-08-15 PROCEDURE — 3079F DIAST BP 80-89 MM HG: CPT | Mod: CPTII,S$GLB,, | Performed by: FAMILY MEDICINE

## 2025-08-15 PROCEDURE — 93016 CV STRESS TEST SUPVJ ONLY: CPT | Mod: ,,, | Performed by: INTERNAL MEDICINE

## 2025-08-15 PROCEDURE — 1159F MED LIST DOCD IN RCRD: CPT | Mod: CPTII,S$GLB,, | Performed by: FAMILY MEDICINE

## 2025-08-15 RX ORDER — FAMOTIDINE 40 MG/1
40 TABLET, FILM COATED ORAL 2 TIMES DAILY PRN
Qty: 180 TABLET | Refills: 0 | Status: SHIPPED | OUTPATIENT
Start: 2025-08-15

## 2025-08-24 DIAGNOSIS — E66.811 CLASS 1 OBESITY WITHOUT SERIOUS COMORBIDITY WITH BODY MASS INDEX (BMI) OF 31.0 TO 31.9 IN ADULT, UNSPECIFIED OBESITY TYPE: ICD-10-CM

## 2025-08-25 ENCOUNTER — PATIENT MESSAGE (OUTPATIENT)
Dept: PRIMARY CARE CLINIC | Facility: CLINIC | Age: 45
End: 2025-08-25
Payer: COMMERCIAL

## 2025-08-25 DIAGNOSIS — E78.5 HYPERLIPIDEMIA, UNSPECIFIED HYPERLIPIDEMIA TYPE: Primary | ICD-10-CM

## 2025-08-25 DIAGNOSIS — E66.811 CLASS 1 OBESITY WITHOUT SERIOUS COMORBIDITY WITH BODY MASS INDEX (BMI) OF 31.0 TO 31.9 IN ADULT, UNSPECIFIED OBESITY TYPE: ICD-10-CM

## 2025-08-26 RX ORDER — SEMAGLUTIDE 0.5 MG/.5ML
0.5 INJECTION, SOLUTION SUBCUTANEOUS
Qty: 2 ML | Refills: 1 | OUTPATIENT
Start: 2025-08-26

## 2025-08-27 ENCOUNTER — PATIENT MESSAGE (OUTPATIENT)
Dept: PRIMARY CARE CLINIC | Facility: CLINIC | Age: 45
End: 2025-08-27
Payer: COMMERCIAL

## 2025-08-27 RX ORDER — SEMAGLUTIDE 0.5 MG/.5ML
0.5 INJECTION, SOLUTION SUBCUTANEOUS
Qty: 2 ML | Refills: 1 | Status: SHIPPED | OUTPATIENT
Start: 2025-08-27